# Patient Record
Sex: FEMALE | Race: OTHER | NOT HISPANIC OR LATINO | Employment: OTHER | URBAN - METROPOLITAN AREA
[De-identification: names, ages, dates, MRNs, and addresses within clinical notes are randomized per-mention and may not be internally consistent; named-entity substitution may affect disease eponyms.]

---

## 2023-02-11 ENCOUNTER — HOSPITAL ENCOUNTER (EMERGENCY)
Dept: DATA CONVERSION | Facility: HOSPITAL | Age: 83
Discharge: HOME | End: 2023-02-12
Attending: EMERGENCY MEDICINE

## 2023-02-11 DIAGNOSIS — Z79.899 OTHER LONG TERM (CURRENT) DRUG THERAPY: ICD-10-CM

## 2023-02-11 DIAGNOSIS — I10 ESSENTIAL (PRIMARY) HYPERTENSION: ICD-10-CM

## 2023-02-11 DIAGNOSIS — E78.5 HYPERLIPIDEMIA, UNSPECIFIED: ICD-10-CM

## 2023-02-11 DIAGNOSIS — R10.9 UNSPECIFIED ABDOMINAL PAIN: ICD-10-CM

## 2023-02-11 DIAGNOSIS — E03.9 HYPOTHYROIDISM, UNSPECIFIED: ICD-10-CM

## 2023-02-11 DIAGNOSIS — R42 DIZZINESS AND GIDDINESS: ICD-10-CM

## 2023-02-11 DIAGNOSIS — Z79.82 LONG TERM (CURRENT) USE OF ASPIRIN: ICD-10-CM

## 2023-02-11 DIAGNOSIS — M79.18 MYALGIA, OTHER SITE: ICD-10-CM
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
## 2023-02-11 DIAGNOSIS — Z79.84 LONG TERM (CURRENT) USE OF ORAL HYPOGLYCEMIC DRUGS: ICD-10-CM

## 2023-02-11 DIAGNOSIS — S00.03XA CONTUSION OF SCALP, INITIAL ENCOUNTER: ICD-10-CM

## 2023-02-11 DIAGNOSIS — E11.9 TYPE 2 DIABETES MELLITUS WITHOUT COMPLICATIONS (MULTI): ICD-10-CM

## 2023-02-11 DIAGNOSIS — N39.0 URINARY TRACT INFECTION, SITE NOT SPECIFIED: ICD-10-CM

## 2023-02-11 LAB
ALANINE AMINOTRANSFERASE (SGPT) (U/L) IN SER/PLAS: 13 U/L (ref 7–45)
ALBUMIN (G/DL) IN SER/PLAS: 4.2 G/DL (ref 3.4–5)
ALKALINE PHOSPHATASE (U/L) IN SER/PLAS: 94 U/L (ref 33–136)
ANION GAP IN SER/PLAS: 15 MMOL/L (ref 10–20)
APPEARANCE, URINE: CLEAR
ASPARTATE AMINOTRANSFERASE (SGOT) (U/L) IN SER/PLAS: 13 U/L (ref 9–39)
BASOPHILS (10*3/UL) IN BLOOD BY AUTOMATED COUNT: 0.02 X10E9/L (ref 0–0.1)
BASOPHILS/100 LEUKOCYTES IN BLOOD BY AUTOMATED COUNT: 0.2 % (ref 0–2)
BILIRUBIN TOTAL (MG/DL) IN SER/PLAS: 0.8 MG/DL (ref 0–1.2)
BILIRUBIN, URINE: NEGATIVE
BLOOD, URINE: ABNORMAL
CALCIUM (MG/DL) IN SER/PLAS: 8.6 MG/DL (ref 8.6–10.3)
CARBON DIOXIDE, TOTAL (MMOL/L) IN SER/PLAS: 27 MMOL/L (ref 21–32)
CHLORIDE (MMOL/L) IN SER/PLAS: 97 MMOL/L (ref 98–107)
COLOR, URINE: YELLOW
CREATININE (MG/DL) IN SER/PLAS: 0.59 MG/DL (ref 0.5–1.05)
EOSINOPHILS (10*3/UL) IN BLOOD BY AUTOMATED COUNT: 0 X10E9/L (ref 0–0.4)
EOSINOPHILS/100 LEUKOCYTES IN BLOOD BY AUTOMATED COUNT: 0 % (ref 0–6)
ERYTHROCYTE DISTRIBUTION WIDTH (RATIO) BY AUTOMATED COUNT: 12.5 % (ref 11.5–14.5)
ERYTHROCYTE MEAN CORPUSCULAR HEMOGLOBIN CONCENTRATION (G/DL) BY AUTOMATED: 33.4 G/DL (ref 32–36)
ERYTHROCYTE MEAN CORPUSCULAR VOLUME (FL) BY AUTOMATED COUNT: 89 FL (ref 80–100)
ERYTHROCYTES (10*6/UL) IN BLOOD BY AUTOMATED COUNT: 4.73 X10E12/L (ref 4–5.2)
GFR FEMALE: >90 ML/MIN/1.73M2
GLUCOSE (MG/DL) IN SER/PLAS: 201 MG/DL (ref 74–99)
GLUCOSE, URINE: ABNORMAL MG/DL
HEMATOCRIT (%) IN BLOOD BY AUTOMATED COUNT: 42.2 % (ref 36–46)
HEMOGLOBIN (G/DL) IN BLOOD: 14.1 G/DL (ref 12–16)
IMMATURE GRANULOCYTES/100 LEUKOCYTES IN BLOOD BY AUTOMATED COUNT: 0.4 % (ref 0–0.9)
INR IN PPP BY COAGULATION ASSAY: 1.2 (ref 0.9–1.1)
KETONES, URINE: ABNORMAL MG/DL
LEUKOCYTE ESTERASE, URINE: ABNORMAL
LEUKOCYTES (10*3/UL) IN BLOOD BY AUTOMATED COUNT: 8.4 X10E9/L (ref 4.4–11.3)
LYMPHOCYTES (10*3/UL) IN BLOOD BY AUTOMATED COUNT: 0.87 X10E9/L (ref 0.8–3)
LYMPHOCYTES/100 LEUKOCYTES IN BLOOD BY AUTOMATED COUNT: 10.4 % (ref 13–44)
MONOCYTES (10*3/UL) IN BLOOD BY AUTOMATED COUNT: 0.29 X10E9/L (ref 0.05–0.8)
MONOCYTES/100 LEUKOCYTES IN BLOOD BY AUTOMATED COUNT: 3.5 % (ref 2–10)
NEUTROPHILS (10*3/UL) IN BLOOD BY AUTOMATED COUNT: 7.19 X10E9/L (ref 1.6–5.5)
NEUTROPHILS/100 LEUKOCYTES IN BLOOD BY AUTOMATED COUNT: 85.5 % (ref 40–80)
NITRITE, URINE: NEGATIVE
NRBC (PER 100 WBCS) BY AUTOMATED COUNT: 0 /100 WBC (ref 0–0)
PH, URINE: 6 (ref 5–8)
PLATELETS (10*3/UL) IN BLOOD AUTOMATED COUNT: 200 X10E9/L (ref 150–450)
POTASSIUM (MMOL/L) IN SER/PLAS: 3.6 MMOL/L (ref 3.5–5.3)
PROTEIN TOTAL: 7 G/DL (ref 6.4–8.2)
PROTEIN, URINE: ABNORMAL MG/DL
PROTHROMBIN TIME (PT) IN PPP BY COAGULATION ASSAY: 13.4 SEC (ref 9.8–13.4)
RBC, URINE: ABNORMAL /HPF (ref 0–5)
SODIUM (MMOL/L) IN SER/PLAS: 135 MMOL/L (ref 136–145)
SPECIFIC GRAVITY, URINE: 1.01 (ref 1–1.03)
SQUAMOUS EPITHELIAL CELLS, URINE: <1 /HPF
TROPONIN I, HIGH SENSITIVITY: 7 NG/L (ref 0–13)
UREA NITROGEN (MG/DL) IN SER/PLAS: 10 MG/DL (ref 6–23)
UROBILINOGEN, URINE: <2 MG/DL (ref 0–1.9)
WBC, URINE: ABNORMAL /HPF (ref 0–5)

## 2023-02-12 LAB — POCT GLUCOSE: 207 MG/DL (ref 74–99)

## 2023-02-28 LAB
ATRIAL RATE: 96 BPM
P AXIS: 40 DEGREES
P OFFSET: 195 MS
P ONSET: 135 MS
PR INTERVAL: 156 MS
Q ONSET: 213 MS
QRS COUNT: 15 BEATS
QRS DURATION: 92 MS
QT INTERVAL: 384 MS
QTC CALCULATION(BAZETT): 485 MS
QTC FREDERICIA: 449 MS
R AXIS: -36 DEGREES
T AXIS: 19 DEGREES
T OFFSET: 405 MS
VENTRICULAR RATE: 96 BPM

## 2024-11-01 ENCOUNTER — OFFICE VISIT (OUTPATIENT)
Dept: PRIMARY CARE | Facility: CLINIC | Age: 84
End: 2024-11-01
Payer: MEDICAID

## 2024-11-01 VITALS
OXYGEN SATURATION: 96 % | HEIGHT: 65 IN | SYSTOLIC BLOOD PRESSURE: 134 MMHG | RESPIRATION RATE: 16 BRPM | WEIGHT: 178 LBS | DIASTOLIC BLOOD PRESSURE: 62 MMHG | BODY MASS INDEX: 29.66 KG/M2 | TEMPERATURE: 97.3 F | HEART RATE: 90 BPM

## 2024-11-01 DIAGNOSIS — G30.9 MODERATE ALZHEIMER'S DEMENTIA WITH MOOD DISTURBANCE, UNSPECIFIED TIMING OF DEMENTIA ONSET (MULTI): ICD-10-CM

## 2024-11-01 DIAGNOSIS — E11.9 TYPE 2 DIABETES MELLITUS WITHOUT COMPLICATION, WITHOUT LONG-TERM CURRENT USE OF INSULIN (MULTI): Primary | Chronic | ICD-10-CM

## 2024-11-01 DIAGNOSIS — Z00.00 HEALTH CARE MAINTENANCE: ICD-10-CM

## 2024-11-01 DIAGNOSIS — F02.B3 MODERATE ALZHEIMER'S DEMENTIA WITH MOOD DISTURBANCE, UNSPECIFIED TIMING OF DEMENTIA ONSET (MULTI): ICD-10-CM

## 2024-11-01 PROBLEM — E87.1 HYPONATREMIA: Status: ACTIVE | Noted: 2021-03-01

## 2024-11-01 PROBLEM — E06.3 HYPOTHYROIDISM DUE TO HASHIMOTO'S THYROIDITIS: Status: ACTIVE | Noted: 2021-02-28

## 2024-11-01 PROBLEM — E66.9 OBESITY (BMI 30-39.9): Status: ACTIVE | Noted: 2021-02-23

## 2024-11-01 PROBLEM — E83.42 HYPOMAGNESEMIA: Status: ACTIVE | Noted: 2021-03-01

## 2024-11-01 LAB
ALBUMIN SERPL BCP-MCNC: 4 G/DL (ref 3.4–5)
ALP SERPL-CCNC: 112 U/L (ref 33–136)
ALT SERPL W P-5'-P-CCNC: 12 U/L (ref 7–45)
ANION GAP SERPL CALC-SCNC: 12 MMOL/L (ref 10–20)
AST SERPL W P-5'-P-CCNC: 11 U/L (ref 9–39)
BASOPHILS # BLD AUTO: 0.02 X10*3/UL (ref 0–0.1)
BASOPHILS NFR BLD AUTO: 0.5 %
BILIRUB SERPL-MCNC: 0.7 MG/DL (ref 0–1.2)
BUN SERPL-MCNC: 10 MG/DL (ref 6–23)
CALCIUM SERPL-MCNC: 8.8 MG/DL (ref 8.6–10.3)
CHLORIDE SERPL-SCNC: 100 MMOL/L (ref 98–107)
CO2 SERPL-SCNC: 26 MMOL/L (ref 21–32)
CREAT SERPL-MCNC: 0.63 MG/DL (ref 0.5–1.05)
EGFRCR SERPLBLD CKD-EPI 2021: 89 ML/MIN/1.73M*2
EOSINOPHIL # BLD AUTO: 0.04 X10*3/UL (ref 0–0.4)
EOSINOPHIL NFR BLD AUTO: 1 %
ERYTHROCYTE [DISTWIDTH] IN BLOOD BY AUTOMATED COUNT: 17.1 % (ref 11.5–14.5)
GLUCOSE SERPL-MCNC: 393 MG/DL (ref 74–99)
HCT VFR BLD AUTO: 36 % (ref 36–46)
HGB BLD-MCNC: 10.7 G/DL (ref 12–16)
IMM GRANULOCYTES # BLD AUTO: 0.01 X10*3/UL (ref 0–0.5)
IMM GRANULOCYTES NFR BLD AUTO: 0.2 % (ref 0–0.9)
LYMPHOCYTES # BLD AUTO: 1.31 X10*3/UL (ref 0.8–3)
LYMPHOCYTES NFR BLD AUTO: 31.8 %
MCH RBC QN AUTO: 21.7 PG (ref 26–34)
MCHC RBC AUTO-ENTMCNC: 29.7 G/DL (ref 32–36)
MCV RBC AUTO: 73 FL (ref 80–100)
MONOCYTES # BLD AUTO: 0.32 X10*3/UL (ref 0.05–0.8)
MONOCYTES NFR BLD AUTO: 7.8 %
NEUTROPHILS # BLD AUTO: 2.42 X10*3/UL (ref 1.6–5.5)
NEUTROPHILS NFR BLD AUTO: 58.7 %
NRBC BLD-RTO: 0 /100 WBCS (ref 0–0)
PLATELET # BLD AUTO: 292 X10*3/UL (ref 150–450)
POC HEMOGLOBIN A1C: 9.6 % (ref 4.2–6.5)
POTASSIUM SERPL-SCNC: 4.2 MMOL/L (ref 3.5–5.3)
PROT SERPL-MCNC: 7 G/DL (ref 6.4–8.2)
RBC # BLD AUTO: 4.93 X10*6/UL (ref 4–5.2)
SODIUM SERPL-SCNC: 134 MMOL/L (ref 136–145)
TSH SERPL-ACNC: 3.27 MIU/L (ref 0.44–3.98)
WBC # BLD AUTO: 4.1 X10*3/UL (ref 4.4–11.3)

## 2024-11-01 PROCEDURE — 80053 COMPREHEN METABOLIC PANEL: CPT

## 2024-11-01 PROCEDURE — 84443 ASSAY THYROID STIM HORMONE: CPT

## 2024-11-01 PROCEDURE — 85025 COMPLETE CBC W/AUTO DIFF WBC: CPT

## 2024-11-01 PROCEDURE — 1159F MED LIST DOCD IN RCRD: CPT

## 2024-11-01 PROCEDURE — 83036 HEMOGLOBIN GLYCOSYLATED A1C: CPT

## 2024-11-01 PROCEDURE — 99204 OFFICE O/P NEW MOD 45 MIN: CPT

## 2024-11-01 RX ORDER — ATORVASTATIN CALCIUM 10 MG/1
1 TABLET, FILM COATED ORAL DAILY
COMMUNITY

## 2024-11-01 RX ORDER — METOPROLOL TARTRATE 25 MG/1
25 TABLET, FILM COATED ORAL
COMMUNITY

## 2024-11-01 RX ORDER — METFORMIN HYDROCHLORIDE 500 MG/1
500 TABLET ORAL 2 TIMES DAILY
COMMUNITY

## 2024-11-01 RX ORDER — ASPIRIN 81 MG/1
81 TABLET ORAL
COMMUNITY
End: 2024-11-01 | Stop reason: WASHOUT

## 2024-11-01 RX ORDER — LEVOTHYROXINE SODIUM 25 UG/1
1 TABLET ORAL DAILY
COMMUNITY

## 2024-11-01 NOTE — PROGRESS NOTES
"Subjective   Patient ID: Daron Burrell is a 84 y.o. female who presents for Diabetes (New patient DM/Recently moved for [place]), Hypertension (HTN concerns), and Dementia (Dementia is worsening. /With SON Jose ).    Patient here today to establish care.  Is here with son who is part-time caregiver.  Patient otherwise living at home independently.  Was previously receiving care in [place] however had moved here to be closer to other family.  Additionally concerns for patient's underlying diabetes and dementia.  Does have established history of dementia which patient is on states has been gradually worsening.  No recent acute drop-offs of cognition that is endorsed or other neurological losses of function.  Patient does live alone, son states that he has placed cameras in her home and lives approximately 5 minutes away.  Is busy with all personal and professional life however does have granddaughters that are able and willing to assist grandmother.  Has cell phone and life alert as well.  Son also states that he is attempting to set up partial home health aide services and has gone through great lengths and establishing connections with services.  Is concerned with mother's mood and behavior as well stating that she appears more withdrawn and unmotivated which she feels to be related to dementia symptoms.  Patient is compliant with all currently prescribed medications that are reported.  Does use pill organizer.  Diet is stated to be poor, patient son states that he feels as though his mother forgets to eat or has little or no interest in eating secondary to loss of her sense of smell/taste (due to Afrin use).  No reported regular glucose monitoring.         Review of Systems    Objective   /62 (BP Location: Right arm, Patient Position: Sitting)   Pulse 90   Temp 36.3 °C (97.3 °F)   Resp 16   Ht 1.651 m (5' 5\")   Wt 80.7 kg (178 lb)   SpO2 96%   BMI 29.62 kg/m²     Physical Exam  Constitutional:      "  General: She is not in acute distress.     Appearance: Normal appearance. She is not ill-appearing.      Comments: Participatory with exam, calm and pleasant.  Interview and physical was carried out with aid of son's translation efforts.   Cardiovascular:      Rate and Rhythm: Normal rate and regular rhythm.      Heart sounds: No murmur heard.     No friction rub. No gallop.   Pulmonary:      Effort: Pulmonary effort is normal. No respiratory distress.      Breath sounds: Normal breath sounds. No wheezing, rhonchi or rales.   Abdominal:      General: Abdomen is flat.      Palpations: Abdomen is soft.      Tenderness: There is no abdominal tenderness.   Musculoskeletal:      Right lower leg: No edema.      Left lower leg: No edema.   Neurological:      General: No focal deficit present.      Mental Status: She is alert. Mental status is at baseline.      Comments: Fine pill-rolling tremor present within right hand.   Psychiatric:         Mood and Affect: Mood normal.         Behavior: Behavior normal.         Assessment/Plan   Problem List Items Addressed This Visit             ICD-10-CM    Type 2 diabetes mellitus without complication, without long-term current use of insulin (Multi) - Primary (Chronic) E11.9    Relevant Orders    POCT glycosylated hemoglobin (Hb A1C) manually resulted (Completed)    Moderate Alzheimer's dementia with mood disturbance (Multi) G30.9, F02.B3     Dementia at this time moderate, given that English is second language is difficult to establish patient's mental baseline from conversation alone.  Alzheimer's most likely etiology however cannot exclude Parkinson's disease given tremor notable, vascular dementia given vascular risk factors such as uncontrolled diabetes.  Sounds to be gradual decline per patient's son therefore less suspect a vascular dementia.  Discussed acetylcholine agents such as donepezil to curb memory decline, send patient states that she would like to research these  further.  Will await OSH records to see medical history of cognitive decline.          Other Visit Diagnoses         Codes    Health care maintenance     Z00.00    Relevant Orders    TSH with reflex to Free T4 if abnormal (Completed)    Comprehensive Metabolic Panel (Completed)    CBC and Auto Differential (Completed)        Will obtain basic blood work at this time.  Patient's A1c significantly elevated however will await remainder of blood work prior to augmenting diabetic management.  Will consider SGLT2 along with increase in metformin.  Will attempt to avoid insulin despite high A1c given patient's age and mental status therefore difficulty in daily dosing.  Will explore further with son of patient regarding safety in home.  I discussed potential risk of home fire given patient memory lapses.  Patient son does feel that given cameras in home and family member proximity and patient's overall mental status that she would be able to respond or be helped in hypothetical emergent situations.  Will complete a release of records to obtain OSH charts.  Will plan close follow-up for further diabetic management

## 2024-11-04 DIAGNOSIS — E83.42 HYPOMAGNESEMIA: Primary | ICD-10-CM

## 2024-11-04 DIAGNOSIS — E53.8 B12 DEFICIENCY: ICD-10-CM

## 2024-11-04 DIAGNOSIS — D50.9 IRON DEFICIENCY ANEMIA, UNSPECIFIED IRON DEFICIENCY ANEMIA TYPE: ICD-10-CM

## 2024-11-04 PROBLEM — G30.9 MODERATE ALZHEIMER'S DEMENTIA WITH MOOD DISTURBANCE (MULTI): Status: ACTIVE | Noted: 2024-11-04

## 2024-11-04 PROBLEM — F02.B3 MODERATE ALZHEIMER'S DEMENTIA WITH MOOD DISTURBANCE (MULTI): Status: ACTIVE | Noted: 2024-11-04

## 2024-11-04 NOTE — ASSESSMENT & PLAN NOTE
History of iron deficiency anemia per chart review.  Will repeat iron panels at this time  Expect secondary to poor diet

## 2024-11-04 NOTE — ASSESSMENT & PLAN NOTE
Dementia at this time moderate, given that English is second language is difficult to establish patient's mental baseline from conversation alone.  Alzheimer's most likely etiology however cannot exclude Parkinson's disease given tremor notable, vascular dementia given vascular risk factors such as uncontrolled diabetes.  Sounds to be gradual decline per patient's son therefore less suspect a vascular dementia.  Discussed acetylcholine agents such as donepezil to curb memory decline, send patient states that she would like to research these further.  Will await OSH records to see medical history of cognitive decline.

## 2024-11-05 ENCOUNTER — LAB (OUTPATIENT)
Dept: LAB | Facility: LAB | Age: 84
End: 2024-11-05
Payer: MEDICAID

## 2024-11-05 DIAGNOSIS — D50.9 IRON DEFICIENCY ANEMIA, UNSPECIFIED IRON DEFICIENCY ANEMIA TYPE: ICD-10-CM

## 2024-11-05 DIAGNOSIS — E53.8 B12 DEFICIENCY: ICD-10-CM

## 2024-11-05 DIAGNOSIS — E83.42 HYPOMAGNESEMIA: ICD-10-CM

## 2024-11-05 LAB
FERRITIN SERPL-MCNC: 8 NG/ML (ref 8–150)
IRON SATN MFR SERPL: 4 % (ref 25–45)
IRON SERPL-MCNC: 13 UG/DL (ref 35–150)
MAGNESIUM SERPL-MCNC: 1.99 MG/DL (ref 1.6–2.4)
TIBC SERPL-MCNC: 337 UG/DL (ref 240–445)
UIBC SERPL-MCNC: 324 UG/DL (ref 110–370)
VIT B12 SERPL-MCNC: 275 PG/ML (ref 211–911)

## 2024-11-05 PROCEDURE — 82728 ASSAY OF FERRITIN: CPT

## 2024-11-05 PROCEDURE — 36415 COLL VENOUS BLD VENIPUNCTURE: CPT

## 2024-11-05 PROCEDURE — 83550 IRON BINDING TEST: CPT

## 2024-11-05 PROCEDURE — 83540 ASSAY OF IRON: CPT

## 2024-11-05 PROCEDURE — 82607 VITAMIN B-12: CPT

## 2024-11-05 PROCEDURE — 83735 ASSAY OF MAGNESIUM: CPT

## 2024-11-06 RX ORDER — FERROUS SULFATE 325(65) MG
1 TABLET ORAL EVERY OTHER DAY
Qty: 45 TABLET | Refills: 0 | Status: SHIPPED | OUTPATIENT
Start: 2024-11-06 | End: 2025-02-04

## 2024-11-12 ENCOUNTER — APPOINTMENT (OUTPATIENT)
Dept: PRIMARY CARE | Facility: CLINIC | Age: 84
End: 2024-11-12
Payer: MEDICAID

## 2024-11-12 VITALS
DIASTOLIC BLOOD PRESSURE: 84 MMHG | TEMPERATURE: 97.2 F | RESPIRATION RATE: 18 BRPM | OXYGEN SATURATION: 97 % | HEART RATE: 96 BPM | SYSTOLIC BLOOD PRESSURE: 138 MMHG | BODY MASS INDEX: 29.62 KG/M2 | WEIGHT: 178 LBS

## 2024-11-12 DIAGNOSIS — D50.9 IRON DEFICIENCY ANEMIA, UNSPECIFIED IRON DEFICIENCY ANEMIA TYPE: ICD-10-CM

## 2024-11-12 DIAGNOSIS — E11.9 TYPE 2 DIABETES MELLITUS WITHOUT COMPLICATION, WITHOUT LONG-TERM CURRENT USE OF INSULIN (MULTI): Primary | Chronic | ICD-10-CM

## 2024-11-12 DIAGNOSIS — E06.3 HYPOTHYROIDISM DUE TO HASHIMOTO'S THYROIDITIS: ICD-10-CM

## 2024-11-12 DIAGNOSIS — I10 PRIMARY HYPERTENSION: ICD-10-CM

## 2024-11-12 PROCEDURE — 99214 OFFICE O/P EST MOD 30 MIN: CPT

## 2024-11-12 PROCEDURE — 1036F TOBACCO NON-USER: CPT

## 2024-11-12 PROCEDURE — 1159F MED LIST DOCD IN RCRD: CPT

## 2024-11-12 PROCEDURE — 3079F DIAST BP 80-89 MM HG: CPT

## 2024-11-12 PROCEDURE — 3075F SYST BP GE 130 - 139MM HG: CPT

## 2024-11-12 RX ORDER — METFORMIN HYDROCHLORIDE 500 MG/1
500 TABLET ORAL 3 TIMES DAILY
Qty: 270 TABLET | Refills: 1 | Status: SHIPPED | OUTPATIENT
Start: 2024-11-12 | End: 2025-05-11

## 2024-11-12 RX ORDER — LEVOTHYROXINE SODIUM 25 UG/1
25 TABLET ORAL DAILY
Qty: 90 TABLET | Refills: 1 | Status: SHIPPED | OUTPATIENT
Start: 2024-11-12 | End: 2025-05-11

## 2024-11-12 RX ORDER — DAPAGLIFLOZIN 5 MG/1
5 TABLET, FILM COATED ORAL DAILY
Qty: 90 TABLET | Refills: 0 | Status: SHIPPED | OUTPATIENT
Start: 2024-11-12 | End: 2025-02-10

## 2024-11-12 RX ORDER — METOPROLOL TARTRATE 25 MG/1
25 TABLET, FILM COATED ORAL
Qty: 60 TABLET | Refills: 0 | Status: SHIPPED | OUTPATIENT
Start: 2024-11-12 | End: 2025-01-11

## 2024-11-12 RX ORDER — ATORVASTATIN CALCIUM 10 MG/1
10 TABLET, FILM COATED ORAL DAILY
Qty: 90 TABLET | Refills: 1 | Status: SHIPPED | OUTPATIENT
Start: 2024-11-12 | End: 2025-05-11

## 2024-11-12 NOTE — PROGRESS NOTES
Subjective   Patient ID: Daron Burrell is a 84 y.o. female who presents for Follow-up (Review labs/Here with son, Jose ).    Recently established patient here today to discuss recent lab results notable for severe hyperglycemia along with iron deficiency anemia.  At previous appointment set of patient had expressed concern regarding patient's mental status.  Has history of dementia.  Were considering treatment for memory loss along with associated mood disturbances (depression, withdrawn).  A1c taken at last visit shown to be significantly elevated 9.6%.  Currently on metformin 500 mg twice daily.  Does have some symptoms of hyperglycemia namely polydipsia/polyuria.  No significant fatigue or confusion.  No GI upset/nausea vomiting concerning for ketosis, no recent precipitous weight loss.  Regarding patient's iron deficiency anemia: Patient is herself poor historian and given that she lives by herself with moderate memory difficulties accuracy of interviewing is limited, however son highly involved with her care and is present at today's visit able to additionally provide testimonial for patient..  Despite this patient denies any melenic stools/hematochezia, abnormal uterine bleeding, abdominal complaints-abdominal fullness, abdominal pain with meals, early satiety.           Review of Systems    Objective   /84 (BP Location: Left arm, Patient Position: Sitting)   Pulse 96   Temp 36.2 °C (97.2 °F)   Resp 18   Wt 80.7 kg (178 lb)   SpO2 97%   BMI 29.62 kg/m²     Physical Exam  Constitutional:       General: She is not in acute distress.     Appearance: Normal appearance. She is not ill-appearing.   Cardiovascular:      Rate and Rhythm: Normal rate and regular rhythm.      Heart sounds: No murmur heard.     No friction rub. No gallop.   Pulmonary:      Effort: Pulmonary effort is normal. No respiratory distress.      Breath sounds: Normal breath sounds. No wheezing, rhonchi or rales.   Abdominal:       General: Abdomen is flat. There is no distension.      Palpations: Abdomen is soft. There is no mass.      Tenderness: There is no abdominal tenderness. There is no guarding.   Neurological:      General: No focal deficit present.      Mental Status: She is alert and oriented to person, place, and time.   Psychiatric:         Mood and Affect: Mood normal.         Behavior: Behavior normal.         Assessment/Plan   Problem List Items Addressed This Visit             ICD-10-CM    Type 2 diabetes mellitus without complication, without long-term current use of insulin (Multi) - Primary (Chronic) E11.9     Increase metformin today to 500 mg 3 times daily  Add SGLT2, 5 mg with eventual up titration to 10.  Denies history of yeast infection or frequent UTIs.  Discussed implementing insulin given high A1c, through shared decision making we will elect for p.o. treatment only.  Monitor fasting blood sugars  Discussed importance of diabetic diet at last visit, limit simple sugars.         Relevant Medications    dapagliflozin propanediol (Farxiga) 5 mg    metFORMIN (Glucophage) 500 mg tablet    atorvastatin (Lipitor) 10 mg tablet    Hypothyroidism due to Hashimoto's thyroiditis E06.3     Stable at this time, most recent TSH shows patient within normal limits.  Continue with Synthroid daily.         Relevant Medications    levothyroxine (Synthroid, Levoxyl) 25 mcg tablet    Iron deficiency anemia D50.9     No concerning features associated with anemia.  Denies any hematochezia or melenic stool or other discernible source of bleeding.  No recent changes in patient's weight.  Will wait results from outside hospital system regarding patient's colonoscopy histories or other abdominal imaging in the setting of malignant process driving anemia.  Patient has been eating less therefore may represent overall nutritional deficiency due to poor diet.  Will repeat blood work in approximately 6 weeks to assess liver stores along with blood  count.         Relevant Orders    CBC    Iron and TIBC    Ferritin    Primary hypertension I10     Await results from outside hospital as to why patient taking metoprolol versus other antihypertensive medications.  If no specific need we will likely transition patient to other antihypertensive medication.  Otherwise blood pressure at goal today.  Continue metoprolol in the interim.         Relevant Medications    metoprolol tartrate (Lopressor) 25 mg tablet   Return to office in 6 weeks  Fabian Narvaez,

## 2024-11-12 NOTE — ASSESSMENT & PLAN NOTE
Await results from outside hospital as to why patient taking metoprolol versus other antihypertensive medications.  If no specific need we will likely transition patient to other antihypertensive medication.  Otherwise blood pressure at goal today.  Continue metoprolol in the interim.  
Increase metformin today to 500 mg 3 times daily  Add SGLT2, 5 mg with eventual up titration to 10.  Denies history of yeast infection or frequent UTIs.  Discussed implementing insulin given high A1c, through shared decision making we will elect for p.o. treatment only.  Monitor fasting blood sugars  Discussed importance of diabetic diet at last visit, limit simple sugars.  
No concerning features associated with anemia.  Denies any hematochezia or melenic stool or other discernible source of bleeding.  No recent changes in patient's weight.  Will wait results from outside hospital system regarding patient's colonoscopy histories or other abdominal imaging in the setting of malignant process driving anemia.  Patient has been eating less therefore may represent overall nutritional deficiency due to poor diet.  Will repeat blood work in approximately 6 weeks to assess liver stores along with blood count.  
Stable at this time, most recent TSH shows patient within normal limits.  Continue with Synthroid daily.  
Apixaban/Eliquis - Compliance/Apixaban/Eliquis - Dietary Advice/Apixaban/Eliquis - Follow up monitoring/Apixaban/Eliquis - Potential for adverse drug reactions and interactions

## 2025-01-07 ENCOUNTER — APPOINTMENT (OUTPATIENT)
Dept: PRIMARY CARE | Facility: CLINIC | Age: 85
End: 2025-01-07
Payer: MEDICAID

## 2025-01-14 ENCOUNTER — APPOINTMENT (OUTPATIENT)
Dept: PRIMARY CARE | Facility: CLINIC | Age: 85
End: 2025-01-14
Payer: MEDICAID

## 2025-01-14 VITALS
WEIGHT: 165 LBS | BODY MASS INDEX: 27.49 KG/M2 | TEMPERATURE: 98.1 F | OXYGEN SATURATION: 97 % | DIASTOLIC BLOOD PRESSURE: 60 MMHG | HEART RATE: 66 BPM | SYSTOLIC BLOOD PRESSURE: 134 MMHG | RESPIRATION RATE: 14 BRPM | HEIGHT: 65 IN

## 2025-01-14 DIAGNOSIS — R63.0 DECREASE IN APPETITE: ICD-10-CM

## 2025-01-14 DIAGNOSIS — E11.9 TYPE 2 DIABETES MELLITUS WITHOUT COMPLICATION, WITHOUT LONG-TERM CURRENT USE OF INSULIN (MULTI): Primary | Chronic | ICD-10-CM

## 2025-01-14 LAB — POC HEMOGLOBIN A1C: 8.8 % (ref 4.2–6.5)

## 2025-01-14 PROCEDURE — 1159F MED LIST DOCD IN RCRD: CPT

## 2025-01-14 PROCEDURE — 3075F SYST BP GE 130 - 139MM HG: CPT

## 2025-01-14 PROCEDURE — 83036 HEMOGLOBIN GLYCOSYLATED A1C: CPT

## 2025-01-14 PROCEDURE — 1036F TOBACCO NON-USER: CPT

## 2025-01-14 PROCEDURE — 1124F ACP DISCUSS-NO DSCNMKR DOCD: CPT

## 2025-01-14 PROCEDURE — 3078F DIAST BP <80 MM HG: CPT

## 2025-01-14 PROCEDURE — 99214 OFFICE O/P EST MOD 30 MIN: CPT

## 2025-01-14 NOTE — ASSESSMENT & PLAN NOTE
Will recommend patient supplement diet with nutritional shakes.  Is down 13 pounds since initial evaluation 2 months prior  Does have underlying anemia and from review of outside hospital records no previous colonoscopy documented therefore cannot exclude GI malignancy however decreased oral intake likely to be underlying etiology.  Return to office for weight recheck 1 month

## 2025-01-14 NOTE — PROGRESS NOTES
"Subjective   Patient ID: Daron Burrell is a 84 y.o. female who presents for Follow-up (2 month follow up. /DM follow up/Her with granddaughter Samira).    Patient here today to follow-up regarding diabetes.  Acute concerns at this time, patient states that she has had decreased appetite.  Denies any visual changes, no nausea vomiting or diarrhea.  Denies any constipation or other bowel changes.  No blood in stool as well.  Patient is compliant with all her medications.  Since most recent dose adjustment has tolerated well.  Down 13 pounds since last evaluation, patient states that she has had poor appetite as of recent.         Review of Systems    Objective   /60 (BP Location: Left arm, Patient Position: Sitting)   Pulse 66   Temp 36.7 °C (98.1 °F)   Resp 14   Ht 1.651 m (5' 5\")   Wt 74.8 kg (165 lb)   SpO2 97%   BMI 27.46 kg/m²     Physical Exam  Constitutional:       General: She is not in acute distress.     Appearance: Normal appearance. She is not ill-appearing.   Eyes:      Conjunctiva/sclera: Conjunctivae normal.   Cardiovascular:      Rate and Rhythm: Normal rate and regular rhythm.      Heart sounds: No murmur heard.     No friction rub. No gallop.   Pulmonary:      Effort: Pulmonary effort is normal. No respiratory distress.      Breath sounds: Normal breath sounds. No wheezing, rhonchi or rales.   Abdominal:      General: There is no distension.      Palpations: Abdomen is soft. There is no mass.      Tenderness: There is no abdominal tenderness. There is no guarding.   Neurological:      General: No focal deficit present.      Mental Status: She is alert and oriented to person, place, and time.   Psychiatric:         Mood and Affect: Mood normal.         Behavior: Behavior normal.         Assessment/Plan   Problem List Items Addressed This Visit             ICD-10-CM    Type 2 diabetes mellitus without complication, without long-term current use of insulin (Multi) - Primary (Chronic) E11.9 "    Relevant Orders    POCT glycosylated hemoglobin (Hb A1C) manually resulted (Completed)    Albumin-Creatinine Ratio, Urine Random    Decrease in appetite R63.0     Will recommend patient supplement diet with nutritional shakes.  Is down 13 pounds since initial evaluation 2 months prior  Does have underlying anemia and from review of outside hospital records no previous colonoscopy documented therefore cannot exclude GI malignancy however decreased oral intake likely to be underlying etiology.  Return to office for weight recheck 1 month        A1c erroneously ordered today, patient not due for 1 month however did show interval improvement in glycemia.  Continue with metformin/Farxiga.  Has tolerated increase in metformin well  Complete albumin to creatinine ratio at next visit.    Fabian Narvaez, DO

## 2025-02-04 LAB
ERYTHROCYTE [DISTWIDTH] IN BLOOD BY AUTOMATED COUNT: 16.8 % (ref 11–15)
FERRITIN SERPL-MCNC: 12 NG/ML (ref 16–288)
HCT VFR BLD AUTO: 47.3 % (ref 35–45)
HGB BLD-MCNC: 14.9 G/DL (ref 11.7–15.5)
IRON SATN MFR SERPL: 14 % (CALC) (ref 16–45)
IRON SERPL-MCNC: 40 MCG/DL (ref 45–160)
MCH RBC QN AUTO: 26.4 PG (ref 27–33)
MCHC RBC AUTO-ENTMCNC: 31.5 G/DL (ref 32–36)
MCV RBC AUTO: 83.9 FL (ref 80–100)
PLATELET # BLD AUTO: 247 THOUSAND/UL (ref 140–400)
PMV BLD REES-ECKER: 9 FL (ref 7.5–12.5)
RBC # BLD AUTO: 5.64 MILLION/UL (ref 3.8–5.1)
TIBC SERPL-MCNC: 287 MCG/DL (CALC) (ref 250–450)
WBC # BLD AUTO: 6 THOUSAND/UL (ref 3.8–10.8)

## 2025-02-16 DIAGNOSIS — E11.9 TYPE 2 DIABETES MELLITUS WITHOUT COMPLICATION, WITHOUT LONG-TERM CURRENT USE OF INSULIN (MULTI): Chronic | ICD-10-CM

## 2025-02-17 RX ORDER — DAPAGLIFLOZIN 5 MG/1
5 TABLET, FILM COATED ORAL DAILY
Qty: 90 TABLET | Refills: 0 | Status: SHIPPED | OUTPATIENT
Start: 2025-02-17 | End: 2025-02-19 | Stop reason: SDUPTHER

## 2025-02-19 ENCOUNTER — APPOINTMENT (OUTPATIENT)
Dept: PRIMARY CARE | Facility: CLINIC | Age: 85
End: 2025-02-19
Payer: MEDICARE

## 2025-02-19 VITALS
DIASTOLIC BLOOD PRESSURE: 62 MMHG | SYSTOLIC BLOOD PRESSURE: 128 MMHG | WEIGHT: 164 LBS | BODY MASS INDEX: 27.32 KG/M2 | HEIGHT: 65 IN | HEART RATE: 76 BPM | TEMPERATURE: 97.1 F | OXYGEN SATURATION: 95 % | RESPIRATION RATE: 18 BRPM

## 2025-02-19 DIAGNOSIS — E06.3 HYPOTHYROIDISM DUE TO HASHIMOTO'S THYROIDITIS: ICD-10-CM

## 2025-02-19 DIAGNOSIS — E11.9 TYPE 2 DIABETES MELLITUS WITHOUT COMPLICATION, WITHOUT LONG-TERM CURRENT USE OF INSULIN (MULTI): Chronic | ICD-10-CM

## 2025-02-19 PROCEDURE — 1159F MED LIST DOCD IN RCRD: CPT

## 2025-02-19 PROCEDURE — 99214 OFFICE O/P EST MOD 30 MIN: CPT

## 2025-02-19 PROCEDURE — 3074F SYST BP LT 130 MM HG: CPT

## 2025-02-19 PROCEDURE — 1036F TOBACCO NON-USER: CPT

## 2025-02-19 PROCEDURE — 3078F DIAST BP <80 MM HG: CPT

## 2025-02-19 PROCEDURE — G2211 COMPLEX E/M VISIT ADD ON: HCPCS

## 2025-02-19 RX ORDER — LEVOTHYROXINE SODIUM 25 UG/1
25 TABLET ORAL DAILY
Qty: 90 TABLET | Refills: 1 | Status: SHIPPED | OUTPATIENT
Start: 2025-02-19 | End: 2025-08-18

## 2025-02-19 RX ORDER — FERROUS SULFATE 325(65) MG
325 TABLET ORAL
COMMUNITY

## 2025-02-19 RX ORDER — DAPAGLIFLOZIN 10 MG/1
10 TABLET, FILM COATED ORAL DAILY
Qty: 90 TABLET | Refills: 1 | Status: SHIPPED | OUTPATIENT
Start: 2025-02-19 | End: 2025-08-18

## 2025-02-19 RX ORDER — METFORMIN HYDROCHLORIDE 500 MG/1
500 TABLET ORAL 3 TIMES DAILY
Qty: 270 TABLET | Refills: 1 | Status: SHIPPED | OUTPATIENT
Start: 2025-02-19 | End: 2025-08-18

## 2025-02-19 RX ORDER — ATORVASTATIN CALCIUM 10 MG/1
10 TABLET, FILM COATED ORAL DAILY
Qty: 90 TABLET | Refills: 1 | Status: SHIPPED | OUTPATIENT
Start: 2025-02-19 | End: 2025-08-18

## 2025-02-19 ASSESSMENT — PATIENT HEALTH QUESTIONNAIRE - PHQ9
1. LITTLE INTEREST OR PLEASURE IN DOING THINGS: NOT AT ALL
2. FEELING DOWN, DEPRESSED OR HOPELESS: NOT AT ALL
SUM OF ALL RESPONSES TO PHQ9 QUESTIONS 1 AND 2: 0

## 2025-02-19 NOTE — PROGRESS NOTES
"Subjective   Patient ID: Daron Burrell is a 84 y.o. female who presents for Follow-up (Review labs. /Greand daughter Mishel wants to talk about meds for Dementia.).    Patient here today with granddaughter for follow-up visit.  Medication review, requesting medication refills for diabetes along with hypothyroid and lipid-lowering medications.  Patient states feeling her normal state of health.  No acute worsening memory concerns voiced by patient's granddaughter at this time.  Does not endorse any significant constipation, review of most recent iron panel shows mild increase in serum ferritin.  Patient is compliant with supplementation.         Review of Systems    Objective   /62 (BP Location: Left arm, Patient Position: Sitting)   Pulse 76   Temp 36.2 °C (97.1 °F)   Resp 18   Ht 1.651 m (5' 5\")   Wt 74.4 kg (164 lb)   SpO2 95%   BMI 27.29 kg/m²     Physical Exam  Constitutional:       General: She is not in acute distress.     Appearance: Normal appearance. She is not ill-appearing.   Eyes:      General: No scleral icterus.  Cardiovascular:      Rate and Rhythm: Normal rate and regular rhythm.      Heart sounds: No murmur heard.     No friction rub. No gallop.   Pulmonary:      Effort: Pulmonary effort is normal. No respiratory distress.      Breath sounds: Normal breath sounds. No wheezing, rhonchi or rales.   Neurological:      General: No focal deficit present.      Mental Status: She is alert and oriented to person, place, and time.   Psychiatric:         Mood and Affect: Mood normal.         Behavior: Behavior normal.         Assessment/Plan   Problem List Items Addressed This Visit             ICD-10-CM    Type 2 diabetes mellitus without complication, without long-term current use of insulin (Multi) (Chronic) E11.9    Relevant Medications    metFORMIN (Glucophage) 500 mg tablet    dapagliflozin propanediol (Farxiga) 10 mg    atorvastatin (Lipitor) 10 mg tablet    Hypothyroidism due to " Hashimoto's thyroiditis E06.3    Relevant Medications    levothyroxine (Synthroid, Levoxyl) 25 mcg tablet   Refills provided for medications at this time, patient tolerating well.  Will increase Farxiga to 10 mg as patient is tolerated well.  Due for A1c in 2 months, no recent urinary tract infection despite SGLT2  Will recheck iron panel as well to ensure adequate supplementation.  If failure of replenishment of iron stores despite supplementation will consider advanced workup including evaluation for occult bleeding.  Most recent blood panel showed improvement of hemoglobin however ferritin remains low.    Follow-up 2 months, repeat A1c at that time, repeat iron panel.  Complete GFR/urine albumin creatinine ratios    Fabian Narvaez DO

## 2025-03-13 ENCOUNTER — APPOINTMENT (OUTPATIENT)
Dept: RADIOLOGY | Facility: HOSPITAL | Age: 85
End: 2025-03-13
Payer: COMMERCIAL

## 2025-03-13 ENCOUNTER — HOSPITAL ENCOUNTER (EMERGENCY)
Facility: HOSPITAL | Age: 85
Discharge: HOME | End: 2025-03-13
Payer: COMMERCIAL

## 2025-03-13 VITALS
RESPIRATION RATE: 18 BRPM | HEIGHT: 65 IN | HEART RATE: 96 BPM | DIASTOLIC BLOOD PRESSURE: 82 MMHG | SYSTOLIC BLOOD PRESSURE: 132 MMHG | OXYGEN SATURATION: 97 % | BODY MASS INDEX: 37.15 KG/M2 | TEMPERATURE: 98.2 F | WEIGHT: 223 LBS

## 2025-03-13 DIAGNOSIS — N39.0 UTI (URINARY TRACT INFECTION), UNCOMPLICATED: Primary | ICD-10-CM

## 2025-03-13 LAB
ALBUMIN SERPL BCP-MCNC: 4.2 G/DL (ref 3.4–5)
ALP SERPL-CCNC: 84 U/L (ref 33–136)
ALT SERPL W P-5'-P-CCNC: 14 U/L (ref 7–45)
ANION GAP SERPL CALC-SCNC: 17 MMOL/L (ref 10–20)
APPEARANCE UR: CLEAR
AST SERPL W P-5'-P-CCNC: 23 U/L (ref 9–39)
BACTERIA #/AREA URNS AUTO: ABNORMAL /HPF
BASOPHILS # BLD AUTO: 0.02 X10*3/UL (ref 0–0.1)
BASOPHILS NFR BLD AUTO: 0.3 %
BILIRUB SERPL-MCNC: 0.7 MG/DL (ref 0–1.2)
BILIRUB UR STRIP.AUTO-MCNC: NEGATIVE MG/DL
BUN SERPL-MCNC: 12 MG/DL (ref 6–23)
CALCIUM SERPL-MCNC: 9.1 MG/DL (ref 8.6–10.3)
CARDIAC TROPONIN I PNL SERPL HS: 3 NG/L (ref 0–13)
CHLORIDE SERPL-SCNC: 102 MMOL/L (ref 98–107)
CO2 SERPL-SCNC: 21 MMOL/L (ref 21–32)
COLOR UR: YELLOW
CREAT SERPL-MCNC: 0.49 MG/DL (ref 0.5–1.05)
EGFRCR SERPLBLD CKD-EPI 2021: >90 ML/MIN/1.73M*2
EOSINOPHIL # BLD AUTO: 0.02 X10*3/UL (ref 0–0.4)
EOSINOPHIL NFR BLD AUTO: 0.3 %
ERYTHROCYTE [DISTWIDTH] IN BLOOD BY AUTOMATED COUNT: 15 % (ref 11.5–14.5)
FLUAV RNA RESP QL NAA+PROBE: NOT DETECTED
FLUBV RNA RESP QL NAA+PROBE: NOT DETECTED
GLUCOSE SERPL-MCNC: 166 MG/DL (ref 74–99)
GLUCOSE UR STRIP.AUTO-MCNC: ABNORMAL MG/DL
HCT VFR BLD AUTO: 45.3 % (ref 36–46)
HGB BLD-MCNC: 14.9 G/DL (ref 12–16)
IMM GRANULOCYTES # BLD AUTO: 0.02 X10*3/UL (ref 0–0.5)
IMM GRANULOCYTES NFR BLD AUTO: 0.3 % (ref 0–0.9)
KETONES UR STRIP.AUTO-MCNC: NEGATIVE MG/DL
LEUKOCYTE ESTERASE UR QL STRIP.AUTO: NEGATIVE
LYMPHOCYTES # BLD AUTO: 1.63 X10*3/UL (ref 0.8–3)
LYMPHOCYTES NFR BLD AUTO: 21.6 %
MAGNESIUM SERPL-MCNC: 2.38 MG/DL (ref 1.6–2.4)
MCH RBC QN AUTO: 27.5 PG (ref 26–34)
MCHC RBC AUTO-ENTMCNC: 32.9 G/DL (ref 32–36)
MCV RBC AUTO: 84 FL (ref 80–100)
MONOCYTES # BLD AUTO: 0.43 X10*3/UL (ref 0.05–0.8)
MONOCYTES NFR BLD AUTO: 5.7 %
MUCOUS THREADS #/AREA URNS AUTO: ABNORMAL /LPF
NEUTROPHILS # BLD AUTO: 5.44 X10*3/UL (ref 1.6–5.5)
NEUTROPHILS NFR BLD AUTO: 71.8 %
NITRITE UR QL STRIP.AUTO: ABNORMAL
NRBC BLD-RTO: 0 /100 WBCS (ref 0–0)
PH UR STRIP.AUTO: 5 [PH]
PLATELET # BLD AUTO: 219 X10*3/UL (ref 150–450)
POTASSIUM SERPL-SCNC: 4.5 MMOL/L (ref 3.5–5.3)
PROT SERPL-MCNC: 7.6 G/DL (ref 6.4–8.2)
PROT UR STRIP.AUTO-MCNC: NEGATIVE MG/DL
RBC # BLD AUTO: 5.42 X10*6/UL (ref 4–5.2)
RBC # UR STRIP.AUTO: ABNORMAL MG/DL
RBC #/AREA URNS AUTO: ABNORMAL /HPF
RSV RNA RESP QL NAA+PROBE: NOT DETECTED
SARS-COV-2 RNA RESP QL NAA+PROBE: NOT DETECTED
SODIUM SERPL-SCNC: 135 MMOL/L (ref 136–145)
SP GR UR STRIP.AUTO: 1.01
UROBILINOGEN UR STRIP.AUTO-MCNC: ABNORMAL MG/DL
WBC # BLD AUTO: 7.6 X10*3/UL (ref 4.4–11.3)
WBC #/AREA URNS AUTO: ABNORMAL /HPF

## 2025-03-13 PROCEDURE — 87637 SARSCOV2&INF A&B&RSV AMP PRB: CPT | Performed by: PHYSICIAN ASSISTANT

## 2025-03-13 PROCEDURE — 2500000001 HC RX 250 WO HCPCS SELF ADMINISTERED DRUGS (ALT 637 FOR MEDICARE OP): Performed by: PHYSICIAN ASSISTANT

## 2025-03-13 PROCEDURE — 85025 COMPLETE CBC W/AUTO DIFF WBC: CPT | Performed by: STUDENT IN AN ORGANIZED HEALTH CARE EDUCATION/TRAINING PROGRAM

## 2025-03-13 PROCEDURE — 72125 CT NECK SPINE W/O DYE: CPT

## 2025-03-13 PROCEDURE — 36415 COLL VENOUS BLD VENIPUNCTURE: CPT | Performed by: STUDENT IN AN ORGANIZED HEALTH CARE EDUCATION/TRAINING PROGRAM

## 2025-03-13 PROCEDURE — 84484 ASSAY OF TROPONIN QUANT: CPT | Performed by: PHYSICIAN ASSISTANT

## 2025-03-13 PROCEDURE — 70450 CT HEAD/BRAIN W/O DYE: CPT

## 2025-03-13 PROCEDURE — 99285 EMERGENCY DEPT VISIT HI MDM: CPT | Mod: 25

## 2025-03-13 PROCEDURE — 80053 COMPREHEN METABOLIC PANEL: CPT | Performed by: STUDENT IN AN ORGANIZED HEALTH CARE EDUCATION/TRAINING PROGRAM

## 2025-03-13 PROCEDURE — 99285 EMERGENCY DEPT VISIT HI MDM: CPT | Performed by: PHYSICIAN ASSISTANT

## 2025-03-13 PROCEDURE — 87086 URINE CULTURE/COLONY COUNT: CPT | Mod: STJLAB | Performed by: PHYSICIAN ASSISTANT

## 2025-03-13 PROCEDURE — 71046 X-RAY EXAM CHEST 2 VIEWS: CPT | Mod: FOREIGN READ | Performed by: RADIOLOGY

## 2025-03-13 PROCEDURE — 81001 URINALYSIS AUTO W/SCOPE: CPT | Performed by: PHYSICIAN ASSISTANT

## 2025-03-13 PROCEDURE — 71046 X-RAY EXAM CHEST 2 VIEWS: CPT

## 2025-03-13 PROCEDURE — 83735 ASSAY OF MAGNESIUM: CPT | Performed by: PHYSICIAN ASSISTANT

## 2025-03-13 RX ORDER — CEPHALEXIN 500 MG/1
500 CAPSULE ORAL ONCE
Status: COMPLETED | OUTPATIENT
Start: 2025-03-13 | End: 2025-03-13

## 2025-03-13 RX ORDER — CEPHALEXIN 500 MG/1
500 CAPSULE ORAL 4 TIMES DAILY
Qty: 28 CAPSULE | Refills: 0 | Status: SHIPPED | OUTPATIENT
Start: 2025-03-13 | End: 2025-03-17 | Stop reason: WASHOUT

## 2025-03-13 RX ADMIN — CEPHALEXIN 500 MG: 500 CAPSULE ORAL at 17:17

## 2025-03-13 ASSESSMENT — PAIN SCALES - GENERAL: PAINLEVEL_OUTOF10: 0 - NO PAIN

## 2025-03-13 ASSESSMENT — PAIN - FUNCTIONAL ASSESSMENT: PAIN_FUNCTIONAL_ASSESSMENT: 0-10

## 2025-03-13 ASSESSMENT — COLUMBIA-SUICIDE SEVERITY RATING SCALE - C-SSRS
1. IN THE PAST MONTH, HAVE YOU WISHED YOU WERE DEAD OR WISHED YOU COULD GO TO SLEEP AND NOT WAKE UP?: NO
2. HAVE YOU ACTUALLY HAD ANY THOUGHTS OF KILLING YOURSELF?: NO
6. HAVE YOU EVER DONE ANYTHING, STARTED TO DO ANYTHING, OR PREPARED TO DO ANYTHING TO END YOUR LIFE?: NO

## 2025-03-13 NOTE — ED PROVIDER NOTES
"Emergency Department Encounter  [facility] EMERGENCY MEDICINE    Patient: Daron Burrell  MRN: [id number]  : 1940  Date of Evaluation: 3/13/2025  ED Provider: Pricilla Gastelum PA-C        History of Present Illness     This is an 84-year-old female with past medical history of Alzheimer's dementia, diabetes, anemia, hypertension, hypomagnesia, hyponatremia who presents to the ED for generalized malaise.  Patient is a poor historian so history was limited.  Patient states that for the past 3 to 4 weeks she has been feeling very tired and has not been eating much.  She endorses dizziness that comes and goes.  She does endorse associated nausea as well as occasional vomiting.  Denies diarrhea.  Denies abdominal pain, denies urinary symptoms.  Does endorse occasional headaches.  Is unable to describe the headaches.  Denies visual changes, weakness, paresthesias or areas of decree sensation.  Patient denies any falls, however per review of the record she was seen by her primary care provider today and her grandson had stated she had a fall during that visit and she was referred here to the ED due to her symptoms as well as his fall.               Visit Vitals  /82 (BP Location: Left arm, Patient Position: Sitting)   Pulse 96   Temp 36.8 °C (98.2 °F) (Temporal)   Resp 18   Ht 1.651 m (5' 5\")   Wt 101 kg (223 lb)   SpO2 97%   BMI 37.11 kg/m²   Smoking Status Never   BSA 2.15 m²          Physical Exam       Triage vitals:  T 35.6 °C (96.1 °F)  HR 86  /69  RR 16  O2 97 % None (Room air)    Physical Exam     Physical exam:   General: Vitals noted, no distress. Afebrile.   EENT:  Hearing grossly intact. Normal phonation.  Dry mucous membranes. Airway patient. PERRL. EOMI.   Neck: No midline tenderness or paraspinal tenderness. FROM.   Cardiac: Regular, rate, rhythm. Normal S1 and S2.  No murmurs, gallops, rubs.   Pulmonary: Good air exchange. Lungs clear bilaterally. No wheezes, rhonchi, " rales. No accessory muscle use.   Abdomen: Soft, nonsurgical. Nontender. No peritoneal signs.   Back: No CVA tenderness. No midline tenderness or paraspinal tenderness. No obvious deformity or step off.   Extremities: No peripheral edema.  Full range of motion. Moves all extremities freely. No tenderness throughout extremities.   Skin: No rash. Warm and Dry.   Neuro: No focal neurologic deficits. CN 2-12 grossly intact. Sensation equal bilaterally. No weakness.  No ataxia.  Ambulatory with steady gait.      Results       Labs Reviewed   CBC WITH AUTO DIFFERENTIAL - Abnormal       Result Value    WBC 7.6      nRBC 0.0      RBC 5.42 (*)     Hemoglobin 14.9      Hematocrit 45.3      MCV 84      MCH 27.5      MCHC 32.9      RDW 15.0 (*)     Platelets 219      Neutrophils % 71.8      Immature Granulocytes %, Automated 0.3      Lymphocytes % 21.6      Monocytes % 5.7      Eosinophils % 0.3      Basophils % 0.3      Neutrophils Absolute 5.44      Immature Granulocytes Absolute, Automated 0.02      Lymphocytes Absolute 1.63      Monocytes Absolute 0.43      Eosinophils Absolute 0.02      Basophils Absolute 0.02     COMPREHENSIVE METABOLIC PANEL - Abnormal    Glucose 166 (*)     Sodium 135 (*)     Potassium 4.5      Chloride 102      Bicarbonate 21      Anion Gap 17      Urea Nitrogen 12      Creatinine 0.49 (*)     eGFR >90      Calcium 9.1      Albumin 4.2      Alkaline Phosphatase 84      Total Protein 7.6      AST 23      Bilirubin, Total 0.7      ALT 14     URINALYSIS WITH REFLEX CULTURE AND MICROSCOPIC - Abnormal    Color, Urine Yellow      Appearance, Urine Clear      Specific Gravity, Urine 1.010      pH, Urine 5.0      Protein, Urine NEGATIVE      Glucose, Urine 1000 (4+) (*)     Blood, Urine 0.03 (TRACE) (*)     Ketones, Urine NEGATIVE      Bilirubin, Urine NEGATIVE      Urobilinogen, Urine NORM      Nitrite, Urine 1+ (*)     Leukocyte Esterase, Urine NEGATIVE     MICROSCOPIC ONLY, URINE - Abnormal    WBC, Urine  1-5      RBC, Urine 6-10 (*)     Bacteria, Urine 1+ (*)     Mucus, Urine FEW     MAGNESIUM - Normal    Magnesium 2.38     TROPONIN I, HIGH SENSITIVITY - Normal    Troponin I, High Sensitivity 3      Narrative:     Less than 99th percentile of normal range cutoff-  Female and children under 18 years old <14 ng/L; Male <21 ng/L: Negative  Repeat testing should be performed if clinically indicated.     Female and children under 18 years old 14-50 ng/L; Male 21-50 ng/L:  Consistent with possible cardiac damage and possible increased clinical   risk. Serial measurements may help to assess extent of myocardial damage.     >50 ng/L: Consistent with cardiac damage, increased clinical risk and  myocardial infarction. Serial measurements may help assess extent of   myocardial damage.      NOTE: Children less than 1 year old may have higher baseline troponin   levels and results should be interpreted in conjunction with the overall   clinical context.     NOTE: Troponin I testing is performed using a different   testing methodology at [facility] than at [facility]. Direct result comparisons should only   be made within the same method.   SARS-COV-2, INFLUENZA A/B AND RSV PCR - Normal    Coronavirus 2019, PCR Not Detected      Flu A Result Not Detected      Flu B Result Not Detected      RSV PCR Not Detected      Narrative:     This assay is an FDA-cleared, in vitro diagnostic nucleic acid amplification test for the qualitative detection and differentiation of SARS CoV-2/ Influenza A/B/ RSV from nasopharyngeal specimens collected from individuals with signs and symptoms of respiratory tract infections, and has been validated for use at [facility]. Negative results do not preclude COVID-19/ Influenza A/B/ RSV infections and should not be used as the sole basis for diagnosis, treatment, or other management decisions. Testing for SARS CoV-2 is recommended only for patients who  meet current clinical and/or epidemiological criteria defined by federal, state, or local public health directives.   URINE CULTURE   URINALYSIS WITH REFLEX CULTURE AND MICROSCOPIC    Narrative:     The following orders were created for panel order Urinalysis with Reflex Culture and Microscopic.  Procedure                               Abnormality         Status                     ---------                               -----------         ------                     Urinalysis with Reflex C...[[id number]]  Abnormal            Final result               Extra Urine Gray Tube[[id number]]                            In process                   Please view results for these tests on the individual orders.   EXTRA URINE GRAY TUBE       CT head wo IV contrast   Final Result   Age-related atrophy and small-vessel ischemic changes of the cerebral   white matter.        No CT evidence of acute intracranial hemorrhage or mass effect.             MACRO:   None        Signed by: Demond Lowry 3/13/2025 3:18 PM   Dictation workstation:   [id number]      CT cervical spine wo IV contrast   Final Result   Multilevel cervical spondylosis and facet arthrosis with mild   degenerative anterior subluxation of C4 and C5.        No acute fracture or traumatic subluxation.        Multilevel bulging disc, greater at C3-4 with moderate central canal   narrowing. There are 2 foci of epidural air anteriorly on the left,   likely related to disc.        Multilevel bilateral neural foraminal narrowing as detailed above.             MACRO:   None        Signed by: Demond Lowry 3/13/2025 3:26 PM   Dictation workstation:   [id number]      XR chest 2 views   Final Result   Mildly prominent lung markings at the bases which are probably   chronic.  Clinical follow-up recommended with repeat x-ray if there   are abnormal auscultatory findings at the lung bases..   Signed by Kenney Cueva MD            Medical Decision Making & ED Course          ED Course & MDM     Medical Decision Making  This is an 84-year-old female with past medical history including dementia, diabetes, anemia, hypertension, hypomagnesia as well as hyponatremia who presents to the ED with several weeks of dizziness, decreased p.o. intake, generalized malaise as well as reported fall.  Vital stable upon arrival to the ED.  On examination history from the patient was limited.  Patient grossly neurologically intact without deficits.  No nystagmus or ataxia noted on exam.  No midline C, T, or L-spine tenderness.  Lungs clear to auscultation.  No audible cardiac murmurs.  Abdomen soft and nontender.  CT head and C-spine ordered due to reported fall and patient's dementia.  Chest x-ray as well as laboratory studies ordered.  UA ordered.  EKG ordered.  Low suspicion for acute stroke causing patient's dizziness as it appears she has had multiple previous visits for chronic dizziness.  I called the patient's family who states that she does have significant baseline confusion secondary to her dementia.  They do feel she has sufficient care at home between them as well as a caretaker.  Patient's workup today was concerning for UTI.  She was ordered Keflex for this.  Troponin normal at 3.  Magnesium normal 2.38.  Negative viral swabs.  CMP overall grossly unremarkable.  CBC also grossly unremarkable.  CT head showed no acute intercranial bleed or skull fracture.  CT C-spine showed no acute fracture or subluxation.  Chest x-ray with mildly prominent lung markings at the bases, likely chronic.  Patient had no adventitious lung sounds on exam.  Low clinical suspicion for pneumonia based on patient's exam, vitals, and this x-ray.  Patient was able to ambulate here in the ED without assistance.  I called the patient's family who did feel comfortable caring for her at home with this UTI.  She was written a petition for Keflex to go home with.  Patient's family had no further questions at this  time.  These results were discussed with the patient as well.  She was discharged from the ED in stable condition with instructions to follow-up with her primary care provider.    Risk  Prescription drug management.         Diagnoses as of 03/13/25 1720   UTI (urinary tract infection), uncomplicated            Independent Result Review and Interpretation: CT head as interpreted by me revealed no visualized intercranial bleed or skull fracture.          Disposition   As a result of the work-up, the patient was discharged home.  she was informed of her diagnosis and instructed to come back with any concerns or worsening of condition.  she and was agreeable to the plan as discussed above.  she was given the opportunity to ask questions.  All of the patient's questions were answered.    Procedures     Patient was seen independently    Procedures    Pricilla Gastelum PA-C  Emergency Medicine      Pricilla Gastelum PA-C  03/13/25 1720

## 2025-03-14 LAB — HOLD SPECIMEN: NORMAL

## 2025-03-16 LAB — BACTERIA UR CULT: ABNORMAL

## 2025-03-17 ENCOUNTER — TELEPHONE (OUTPATIENT)
Dept: PHARMACY | Facility: HOSPITAL | Age: 85
End: 2025-03-17
Payer: COMMERCIAL

## 2025-03-17 NOTE — PROGRESS NOTES
EDPD Note: Rapid Result Review    I reviewed Daron Burrell 's chart regarding a positive urine culture/result that was taken during their recent emergency room visit. The patient was not told about these results prior to leaving the emergency department. Therefore, patient was contacted and given appropriate education.     Patient presented in ED on 3/13 for generalized malaise. Patient has alzheimer' dementia. Denied any urinary symptoms. Was discharged with Keflex 500 mg QID x 7 days, which is resident. Patient's granddaughter report patient does not endorse any urinary symptoms, stating that she has been refusing antibiotics jurgen she believe she does not an UTI. Direct granddaughter she stopped antibiotic use due to no treatment is needed based on  Guideline for asymptomatic UTI and follow up with PCP if further evaluation is needed.    Susceptibility data from last 90 days.  Collected Specimen Info Organism Amoxicillin/Clavulanate Ampicillin Ampicillin/Sulbactam Aztreonam Cefazolin Cefazolin (uncomplicated UTIs only) Cefepime Cefotaxime Ceftazidime Ceftriaxone Ciprofloxacin Ertapenem   03/13/25 Urine from Clean Catch/Voided Escherichia coli  S  R  S  R  R  R  R  R  R  R  S  S     Collected Specimen Info Organism Gentamicin Meropenem Nitrofurantoin Piperacillin/Tazobactam Trimethoprim/Sulfamethoxazole   03/13/25 Urine from Clean Catch/Voided Escherichia coli  S  S  S  S  R     Admission on 03/13/2025, Discharged on 03/13/2025   Component Date Value Ref Range Status    WBC 03/13/2025 7.6  4.4 - 11.3 x10*3/uL Final    nRBC 03/13/2025 0.0  0.0 - 0.0 /100 WBCs Final    RBC 03/13/2025 5.42 (H)  4.00 - 5.20 x10*6/uL Final    Hemoglobin 03/13/2025 14.9  12.0 - 16.0 g/dL Final    Hematocrit 03/13/2025 45.3  36.0 - 46.0 % Final    MCV 03/13/2025 84  80 - 100 fL Final    MCH 03/13/2025 27.5  26.0 - 34.0 pg Final    MCHC 03/13/2025 32.9  32.0 - 36.0 g/dL Final    RDW 03/13/2025 15.0 (H)  11.5 - 14.5 % Final    Platelets  03/13/2025 219  150 - 450 x10*3/uL Final    Neutrophils % 03/13/2025 71.8  40.0 - 80.0 % Final    Immature Granulocytes %, Automated 03/13/2025 0.3  0.0 - 0.9 % Final    Immature Granulocyte Count (IG) includes promyelocytes, myelocytes and metamyelocytes but does not include bands. Percent differential counts (%) should be interpreted in the context of the absolute cell counts (cells/UL).    Lymphocytes % 03/13/2025 21.6  13.0 - 44.0 % Final    Monocytes % 03/13/2025 5.7  2.0 - 10.0 % Final    Eosinophils % 03/13/2025 0.3  0.0 - 6.0 % Final    Basophils % 03/13/2025 0.3  0.0 - 2.0 % Final    Neutrophils Absolute 03/13/2025 5.44  1.60 - 5.50 x10*3/uL Final    Percent differential counts (%) should be interpreted in the context of the absolute cell counts (cells/uL).    Immature Granulocytes Absolute, Au* 03/13/2025 0.02  0.00 - 0.50 x10*3/uL Final    Lymphocytes Absolute 03/13/2025 1.63  0.80 - 3.00 x10*3/uL Final    Monocytes Absolute 03/13/2025 0.43  0.05 - 0.80 x10*3/uL Final    Eosinophils Absolute 03/13/2025 0.02  0.00 - 0.40 x10*3/uL Final    Basophils Absolute 03/13/2025 0.02  0.00 - 0.10 x10*3/uL Final    Glucose 03/13/2025 166 (H)  74 - 99 mg/dL Final    Sodium 03/13/2025 135 (L)  136 - 145 mmol/L Final    Potassium 03/13/2025 4.5  3.5 - 5.3 mmol/L Final    MILD HEMOLYSIS DETECTED. The result may be falsely elevated due to hemolysis or other interferents. Clinical correlation is recommended. Repeat testing may be considered.    Chloride 03/13/2025 102  98 - 107 mmol/L Final    Bicarbonate 03/13/2025 21  21 - 32 mmol/L Final    Anion Gap 03/13/2025 17  10 - 20 mmol/L Final    Urea Nitrogen 03/13/2025 12  6 - 23 mg/dL Final    Creatinine 03/13/2025 0.49 (L)  0.50 - 1.05 mg/dL Final    eGFR 03/13/2025 >90  >60 mL/min/1.73m*2 Final    Calculations of estimated GFR are performed using the 2021 CKD-EPI Study Refit equation without the race variable for the IDMS-Traceable creatinine  methods.  https://jasn.asnjournals.org/content/early/2021/09/22/ASN.2833041490    Calcium 03/13/2025 9.1  8.6 - 10.3 mg/dL Final    Albumin 03/13/2025 4.2  3.4 - 5.0 g/dL Final    Alkaline Phosphatase 03/13/2025 84  33 - 136 U/L Final    Total Protein 03/13/2025 7.6  6.4 - 8.2 g/dL Final    AST 03/13/2025 23  9 - 39 U/L Final    MILD HEMOLYSIS DETECTED. The result may be falsely elevated due to hemolysis or other interferents. Clinical correlation is recommended. Repeat testing may be considered.    Bilirubin, Total 03/13/2025 0.7  0.0 - 1.2 mg/dL Final    ALT 03/13/2025 14  7 - 45 U/L Final    Patients treated with Sulfasalazine may generate falsely decreased results for ALT.    Magnesium 03/13/2025 2.38  1.60 - 2.40 mg/dL Final    MILD HEMOLYSIS DETECTED. The result may be falsely elevated due to hemolysis or other interferents. Clinical correlation is recommended. Repeat testing may be considered.    Troponin I, High Sensitivity 03/13/2025 3  0 - 13 ng/L Final    Coronavirus 2019, PCR 03/13/2025 Not Detected  Not Detected Final    Flu A Result 03/13/2025 Not Detected  Not Detected Final    Flu B Result 03/13/2025 Not Detected  Not Detected Final    RSV PCR 03/13/2025 Not Detected  Not Detected Final    Color, Urine 03/13/2025 Yellow  Straw, Yellow Final    Appearance, Urine 03/13/2025 Clear  Clear Final    Specific Gravity, Urine 03/13/2025 1.010  1.005 - 1.035 Final    pH, Urine 03/13/2025 5.0  5.0, 5.5, 6.0, 6.5, 7.0, 7.5, 8.0 Final    Protein, Urine 03/13/2025 NEGATIVE  NEGATIVE, 10 (TRACE) mg/dL Final    Glucose, Urine 03/13/2025 1000 (4+) (A)  NEGATIVE mg/dL Final    Blood, Urine 03/13/2025 0.03 (TRACE) (A)  NEGATIVE mg/dL Final    Ketones, Urine 03/13/2025 NEGATIVE  NEGATIVE mg/dL Final    Bilirubin, Urine 03/13/2025 NEGATIVE  NEGATIVE mg/dL Final    Urobilinogen, Urine 03/13/2025 NORM  NORM mg/dL Final    Nitrite, Urine 03/13/2025 1+ (A)  NEGATIVE Final    Leukocyte Esterase, Urine 03/13/2025 NEGATIVE   NEGATIVE Final    Extra Tube 03/13/2025 Hold for add-ons.   Final    Auto resulted.    WBC, Urine 03/13/2025 1-5  1-5, NONE /HPF Final    RBC, Urine 03/13/2025 6-10 (A)  NONE, 1-2, 3-5 /HPF Final    Bacteria, Urine 03/13/2025 1+ (A)  NONE SEEN /HPF Final    Mucus, Urine 03/13/2025 FEW  Reference range not established. /LPF Final    Urine Culture 03/13/2025 >=100,000 CFU/mL Escherichia coli (A)   Final    Extended Spectrum Beta Lactamase (ESBL) producing organism       No further follow up needed from EDPD Team.     If there are any other questions for the ED Post-Discharge Culture Follow Up Team, please contact [phone]. Fax: [phone].    Gris Khan RPh

## 2025-03-26 DIAGNOSIS — E11.9 TYPE 2 DIABETES MELLITUS WITHOUT COMPLICATION, WITHOUT LONG-TERM CURRENT USE OF INSULIN: Chronic | ICD-10-CM

## 2025-03-27 RX ORDER — DAPAGLIFLOZIN 10 MG/1
10 TABLET, FILM COATED ORAL DAILY
Qty: 30 TABLET | Refills: 5 | Status: SHIPPED | OUTPATIENT
Start: 2025-03-27 | End: 2025-09-23

## 2025-04-15 ENCOUNTER — APPOINTMENT (OUTPATIENT)
Dept: PRIMARY CARE | Facility: CLINIC | Age: 85
End: 2025-04-15
Payer: COMMERCIAL

## 2025-04-15 VITALS
TEMPERATURE: 97.5 F | BODY MASS INDEX: 37.11 KG/M2 | DIASTOLIC BLOOD PRESSURE: 68 MMHG | SYSTOLIC BLOOD PRESSURE: 120 MMHG | WEIGHT: 223 LBS

## 2025-04-15 DIAGNOSIS — F32.A DEPRESSION, UNSPECIFIED DEPRESSION TYPE: ICD-10-CM

## 2025-04-15 DIAGNOSIS — F02.B3 MODERATE ALZHEIMER'S DEMENTIA WITH MOOD DISTURBANCE, UNSPECIFIED TIMING OF DEMENTIA ONSET (MULTI): ICD-10-CM

## 2025-04-15 DIAGNOSIS — E11.9 TYPE 2 DIABETES MELLITUS WITHOUT COMPLICATION, WITHOUT LONG-TERM CURRENT USE OF INSULIN: Chronic | ICD-10-CM

## 2025-04-15 DIAGNOSIS — D50.9 IRON DEFICIENCY ANEMIA, UNSPECIFIED IRON DEFICIENCY ANEMIA TYPE: Primary | ICD-10-CM

## 2025-04-15 DIAGNOSIS — G30.9 MODERATE ALZHEIMER'S DEMENTIA WITH MOOD DISTURBANCE, UNSPECIFIED TIMING OF DEMENTIA ONSET (MULTI): ICD-10-CM

## 2025-04-15 PROCEDURE — 3078F DIAST BP <80 MM HG: CPT

## 2025-04-15 PROCEDURE — G2211 COMPLEX E/M VISIT ADD ON: HCPCS

## 2025-04-15 PROCEDURE — 99214 OFFICE O/P EST MOD 30 MIN: CPT

## 2025-04-15 PROCEDURE — 3074F SYST BP LT 130 MM HG: CPT

## 2025-04-15 RX ORDER — SERTRALINE HYDROCHLORIDE 25 MG/1
TABLET, FILM COATED ORAL
Qty: 113 TABLET | Refills: 0 | Status: SHIPPED | OUTPATIENT
Start: 2025-04-15 | End: 2025-04-15 | Stop reason: SDUPTHER

## 2025-04-15 RX ORDER — SERTRALINE HYDROCHLORIDE 25 MG/1
TABLET, FILM COATED ORAL
Qty: 173 TABLET | Refills: 0 | Status: SHIPPED | OUTPATIENT
Start: 2025-04-15 | End: 2025-07-14

## 2025-04-15 RX ORDER — DAPAGLIFLOZIN 10 MG/1
10 TABLET, FILM COATED ORAL DAILY
Qty: 30 TABLET | Refills: 5 | Status: SHIPPED | OUTPATIENT
Start: 2025-04-15 | End: 2025-10-12

## 2025-04-15 NOTE — ASSESSMENT & PLAN NOTE
Suspect agitation and unfriendly as likely secondary to underlying dementia disease  Start sertraline 25 mg with up titration to 50mg in 1 week.  Will follow-up at next diabetes appointment in 3 months time  Repeat BMP to ensure no hyponatremia associated with initiation of SSRI.

## 2025-04-15 NOTE — PROGRESS NOTES
Subjective   Patient ID: Daron Burrell is a 85 y.o. female who presents for 2 month follow up.    Patient here today to follow-up regarding diabetes  No acute concerns at this time, overall patient's weight is stable.  He is tolerating Farxiga and metformin well.  Here today with son.  Concerned about patient's mood.  Multiple family members have raise concern regarding patient's irritation/agitation.  Is overall stable.  Have broached topic of antidepressant in the past, patient's son at considered and is wishing to trial SSRI at this time.  Since last in office visit patient has had visit to emergency department for suspected UTI.  Urine culture subsequently was negative.  Discontinue antibiotics.         Review of Systems    Objective   /68 (BP Location: Left arm, Patient Position: Sitting)   Temp 36.4 °C (97.5 °F)   Wt 101 kg (223 lb)   BMI 37.11 kg/m²     Physical Exam  Constitutional:       General: She is not in acute distress.     Appearance: Normal appearance. She is not ill-appearing.   Eyes:      General: No scleral icterus.  Cardiovascular:      Rate and Rhythm: Normal rate and regular rhythm.      Heart sounds: No murmur heard.     No friction rub. No gallop.   Pulmonary:      Effort: Pulmonary effort is normal. No respiratory distress.      Breath sounds: Normal breath sounds. No wheezing, rhonchi or rales.   Musculoskeletal:      Right lower leg: No edema.      Left lower leg: No edema.   Neurological:      General: No focal deficit present.      Mental Status: She is alert and oriented to person, place, and time.   Psychiatric:         Mood and Affect: Mood normal.         Behavior: Behavior normal.         Assessment/Plan   Problem List Items Addressed This Visit             ICD-10-CM    Type 2 diabetes mellitus without complication, without long-term current use of insulin (Chronic) E11.9     Repeat A1c at today's visit, will adjust medications accordingly.  Given advanced age and  moderate dementia goal A1c approximately 8%  As tolerated glycemic controlling medications well.         Relevant Medications    dapagliflozin propanediol (Farxiga) 10 mg tablet    Other Relevant Orders    Albumin-Creatinine Ratio, Urine Random    Hemoglobin A1c    Iron deficiency anemia - Primary D50.9     Low suspicion for chronic blood loss resulting in anemia as patient's red blood cell counts have incremented appropriately.  Suspect dietary etiology, continue with iron supplementation.  Repeat ferritin/iron TIBC             Relevant Orders    Ferritin    Iron and TIBC    Moderate Alzheimer's dementia with mood disturbance (Multi) G30.9, F02.B3     Suspect agitation and unfriendly as likely secondary to underlying dementia disease  Start sertraline 25 mg with up titration to 50mg in 1 week.  Will follow-up at next diabetes appointment in 3 months time  Repeat BMP to ensure no hyponatremia associated with initiation of SSRI.         Relevant Medications    sertraline (Zoloft) 25 mg tablet    Depression F32.A     See Alzheimer's diagnosis         Relevant Medications    sertraline (Zoloft) 25 mg tablet     Follow-up in 3 months for diabetes visit/medication management regarding sertraline.  Complete BMP at this visit    Fabian Narveaz DO

## 2025-04-15 NOTE — ASSESSMENT & PLAN NOTE
Low suspicion for chronic blood loss resulting in anemia as patient's red blood cell counts have incremented appropriately.  Suspect dietary etiology, continue with iron supplementation.  Repeat ferritin/iron TIBC

## 2025-04-15 NOTE — ASSESSMENT & PLAN NOTE
Repeat A1c at today's visit, will adjust medications accordingly.  Given advanced age and moderate dementia goal A1c approximately 8%  As tolerated glycemic controlling medications well.

## 2025-04-16 LAB
ALBUMIN/CREAT UR: 19 MG/G CREAT
CREAT UR-MCNC: 36 MG/DL (ref 20–275)
EST. AVERAGE GLUCOSE BLD GHB EST-MCNC: 203 MG/DL
EST. AVERAGE GLUCOSE BLD GHB EST-SCNC: 11.2 MMOL/L
FERRITIN SERPL-MCNC: 16 NG/ML (ref 16–288)
HBA1C MFR BLD: 8.7 %
IRON SATN MFR SERPL: 28 % (CALC) (ref 16–45)
IRON SERPL-MCNC: 83 MCG/DL (ref 45–160)
MICROALBUMIN UR-MCNC: 0.7 MG/DL
TIBC SERPL-MCNC: 292 MCG/DL (CALC) (ref 250–450)

## 2025-05-12 ENCOUNTER — APPOINTMENT (OUTPATIENT)
Dept: CARDIOLOGY | Facility: HOSPITAL | Age: 85
End: 2025-05-12
Payer: COMMERCIAL

## 2025-05-12 ENCOUNTER — HOSPITAL ENCOUNTER (EMERGENCY)
Facility: HOSPITAL | Age: 85
Discharge: HOME | End: 2025-05-12
Attending: EMERGENCY MEDICINE
Payer: COMMERCIAL

## 2025-05-12 ENCOUNTER — APPOINTMENT (OUTPATIENT)
Dept: RADIOLOGY | Facility: HOSPITAL | Age: 85
End: 2025-05-12
Payer: COMMERCIAL

## 2025-05-12 VITALS
OXYGEN SATURATION: 95 % | HEART RATE: 114 BPM | SYSTOLIC BLOOD PRESSURE: 138 MMHG | HEIGHT: 65 IN | BODY MASS INDEX: 25.09 KG/M2 | DIASTOLIC BLOOD PRESSURE: 86 MMHG | WEIGHT: 150.57 LBS | TEMPERATURE: 95.7 F | RESPIRATION RATE: 18 BRPM

## 2025-05-12 DIAGNOSIS — R11.2 NAUSEA AND VOMITING, UNSPECIFIED VOMITING TYPE: ICD-10-CM

## 2025-05-12 DIAGNOSIS — R42 DIZZINESS: Primary | ICD-10-CM

## 2025-05-12 LAB
ALBUMIN SERPL BCP-MCNC: 4.2 G/DL (ref 3.4–5)
ALP SERPL-CCNC: 71 U/L (ref 33–136)
ALT SERPL W P-5'-P-CCNC: 13 U/L (ref 7–45)
ANION GAP SERPL CALC-SCNC: 16 MMOL/L (ref 10–20)
APPEARANCE UR: CLEAR
AST SERPL W P-5'-P-CCNC: 12 U/L (ref 9–39)
BASOPHILS # BLD AUTO: 0.03 X10*3/UL (ref 0–0.1)
BASOPHILS NFR BLD AUTO: 0.4 %
BILIRUB SERPL-MCNC: 0.6 MG/DL (ref 0–1.2)
BILIRUB UR STRIP.AUTO-MCNC: NEGATIVE MG/DL
BUN SERPL-MCNC: 13 MG/DL (ref 6–23)
CALCIUM SERPL-MCNC: 9.4 MG/DL (ref 8.6–10.3)
CARDIAC TROPONIN I PNL SERPL HS: 3 NG/L (ref 0–13)
CHLORIDE SERPL-SCNC: 97 MMOL/L (ref 98–107)
CO2 SERPL-SCNC: 24 MMOL/L (ref 21–32)
COLOR UR: COLORLESS
CREAT SERPL-MCNC: 0.42 MG/DL (ref 0.5–1.05)
EGFRCR SERPLBLD CKD-EPI 2021: >90 ML/MIN/1.73M*2
EOSINOPHIL # BLD AUTO: 0.04 X10*3/UL (ref 0–0.4)
EOSINOPHIL NFR BLD AUTO: 0.6 %
ERYTHROCYTE [DISTWIDTH] IN BLOOD BY AUTOMATED COUNT: 13.7 % (ref 11.5–14.5)
GLUCOSE SERPL-MCNC: 123 MG/DL (ref 74–99)
GLUCOSE UR STRIP.AUTO-MCNC: ABNORMAL MG/DL
HCT VFR BLD AUTO: 46.9 % (ref 36–46)
HGB BLD-MCNC: 15.9 G/DL (ref 12–16)
IMM GRANULOCYTES # BLD AUTO: 0.01 X10*3/UL (ref 0–0.5)
IMM GRANULOCYTES NFR BLD AUTO: 0.1 % (ref 0–0.9)
KETONES UR STRIP.AUTO-MCNC: NEGATIVE MG/DL
LEUKOCYTE ESTERASE UR QL STRIP.AUTO: NEGATIVE
LIPASE SERPL-CCNC: 56 U/L (ref 9–82)
LYMPHOCYTES # BLD AUTO: 2.15 X10*3/UL (ref 0.8–3)
LYMPHOCYTES NFR BLD AUTO: 31.4 %
MAGNESIUM SERPL-MCNC: 2.18 MG/DL (ref 1.6–2.4)
MCH RBC QN AUTO: 29.3 PG (ref 26–34)
MCHC RBC AUTO-ENTMCNC: 33.9 G/DL (ref 32–36)
MCV RBC AUTO: 86 FL (ref 80–100)
MONOCYTES # BLD AUTO: 0.4 X10*3/UL (ref 0.05–0.8)
MONOCYTES NFR BLD AUTO: 5.8 %
NEUTROPHILS # BLD AUTO: 4.21 X10*3/UL (ref 1.6–5.5)
NEUTROPHILS NFR BLD AUTO: 61.7 %
NITRITE UR QL STRIP.AUTO: NEGATIVE
NRBC BLD-RTO: 0 /100 WBCS (ref 0–0)
PH UR STRIP.AUTO: 5.5 [PH]
PLATELET # BLD AUTO: 204 X10*3/UL (ref 150–450)
POTASSIUM SERPL-SCNC: 3.9 MMOL/L (ref 3.5–5.3)
PROT SERPL-MCNC: 7.3 G/DL (ref 6.4–8.2)
PROT UR STRIP.AUTO-MCNC: NEGATIVE MG/DL
RBC # BLD AUTO: 5.43 X10*6/UL (ref 4–5.2)
RBC # UR STRIP.AUTO: NEGATIVE MG/DL
SODIUM SERPL-SCNC: 133 MMOL/L (ref 136–145)
SP GR UR STRIP.AUTO: 1.01
UROBILINOGEN UR STRIP.AUTO-MCNC: NORMAL MG/DL
WBC # BLD AUTO: 6.8 X10*3/UL (ref 4.4–11.3)

## 2025-05-12 PROCEDURE — 70450 CT HEAD/BRAIN W/O DYE: CPT | Performed by: RADIOLOGY

## 2025-05-12 PROCEDURE — 85025 COMPLETE CBC W/AUTO DIFF WBC: CPT | Performed by: EMERGENCY MEDICINE

## 2025-05-12 PROCEDURE — 2500000004 HC RX 250 GENERAL PHARMACY W/ HCPCS (ALT 636 FOR OP/ED): Mod: JZ

## 2025-05-12 PROCEDURE — 70450 CT HEAD/BRAIN W/O DYE: CPT

## 2025-05-12 PROCEDURE — 83735 ASSAY OF MAGNESIUM: CPT | Performed by: EMERGENCY MEDICINE

## 2025-05-12 PROCEDURE — 99285 EMERGENCY DEPT VISIT HI MDM: CPT | Mod: 25 | Performed by: EMERGENCY MEDICINE

## 2025-05-12 PROCEDURE — 81003 URINALYSIS AUTO W/O SCOPE: CPT | Performed by: EMERGENCY MEDICINE

## 2025-05-12 PROCEDURE — 84484 ASSAY OF TROPONIN QUANT: CPT

## 2025-05-12 PROCEDURE — 96374 THER/PROPH/DIAG INJ IV PUSH: CPT

## 2025-05-12 PROCEDURE — 93005 ELECTROCARDIOGRAM TRACING: CPT

## 2025-05-12 PROCEDURE — 83690 ASSAY OF LIPASE: CPT | Performed by: EMERGENCY MEDICINE

## 2025-05-12 PROCEDURE — 36415 COLL VENOUS BLD VENIPUNCTURE: CPT | Performed by: EMERGENCY MEDICINE

## 2025-05-12 PROCEDURE — 84075 ASSAY ALKALINE PHOSPHATASE: CPT | Performed by: EMERGENCY MEDICINE

## 2025-05-12 PROCEDURE — 80053 COMPREHEN METABOLIC PANEL: CPT | Performed by: EMERGENCY MEDICINE

## 2025-05-12 RX ORDER — ONDANSETRON 4 MG/1
4 TABLET, ORALLY DISINTEGRATING ORAL EVERY 8 HOURS PRN
Qty: 15 TABLET | Refills: 0 | Status: SHIPPED | OUTPATIENT
Start: 2025-05-12

## 2025-05-12 RX ORDER — ONDANSETRON HYDROCHLORIDE 2 MG/ML
4 INJECTION, SOLUTION INTRAVENOUS ONCE
Status: COMPLETED | OUTPATIENT
Start: 2025-05-12 | End: 2025-05-12

## 2025-05-12 RX ORDER — MECLIZINE HYDROCHLORIDE 25 MG/1
12.5 TABLET ORAL 3 TIMES DAILY PRN
Qty: 10 TABLET | Refills: 0 | Status: SHIPPED | OUTPATIENT
Start: 2025-05-12 | End: 2025-05-22

## 2025-05-12 RX ADMIN — ONDANSETRON 4 MG: 2 INJECTION INTRAMUSCULAR; INTRAVENOUS at 17:07

## 2025-05-12 ASSESSMENT — LIFESTYLE VARIABLES
EVER HAD A DRINK FIRST THING IN THE MORNING TO STEADY YOUR NERVES TO GET RID OF A HANGOVER: NO
HAVE PEOPLE ANNOYED YOU BY CRITICIZING YOUR DRINKING: NO
EVER FELT BAD OR GUILTY ABOUT YOUR DRINKING: NO

## 2025-05-12 ASSESSMENT — COLUMBIA-SUICIDE SEVERITY RATING SCALE - C-SSRS
6. HAVE YOU EVER DONE ANYTHING, STARTED TO DO ANYTHING, OR PREPARED TO DO ANYTHING TO END YOUR LIFE?: NO
2. HAVE YOU ACTUALLY HAD ANY THOUGHTS OF KILLING YOURSELF?: NO
1. IN THE PAST MONTH, HAVE YOU WISHED YOU WERE DEAD OR WISHED YOU COULD GO TO SLEEP AND NOT WAKE UP?: NO

## 2025-05-12 ASSESSMENT — PAIN - FUNCTIONAL ASSESSMENT: PAIN_FUNCTIONAL_ASSESSMENT: 0-10

## 2025-05-12 NOTE — ED PROVIDER NOTES
Emergency Department Provider Note        History of Present Illness     History provided by: Patient and Family Member  Limitations to History: Dementia  External Records Reviewed with Brief Summary: Outpatient progress note from 4/15/2025 which showed primary care visit  As well as prior ED provider note from 2/13  HPI:  Daron Burrell is a 85 y.o. female past medical history significant for Alzheimer's dementia, type 2 diabetes, hyperlipidemia, hypothyroidism, hypertension.  Patient states she has had dizziness and nausea since last night.  Family member at bedside helping with history, patient has caregivers that help take care of her at home and states that she was complaining of dizziness and nausea throughout the day.  There was no falls and no vomiting.  Patient has had this complaint many times and wants months/years.  Patient does have some fullness in the ears secondary to bilateral impacted cerumen.  Patient otherwise denying any headache, chest pain, shortness of breath, abdominal pain, pain or discomfort with urination, constipation/diarrhea, numbness tingling or weakness in extremities.  Patient ambulates by self.    Physical Exam   Triage vitals:  T 35.8 °C (96.4 °F)  HR 83  /69  RR 16  O2 96 % None (Room air)    Physical Exam  Vitals and nursing note reviewed.   Constitutional:       Appearance: Normal appearance.   HENT:      Head: Normocephalic and atraumatic.      Right Ear: There is impacted cerumen.      Left Ear: There is impacted cerumen.      Nose: Nose normal.      Mouth/Throat:      Mouth: Mucous membranes are moist.   Eyes:      Pupils: Pupils are equal, round, and reactive to light.   Cardiovascular:      Rate and Rhythm: Regular rhythm.      Pulses: Normal pulses.      Heart sounds: Normal heart sounds.   Pulmonary:      Effort: Pulmonary effort is normal.   Abdominal:      General: Abdomen is flat. Bowel sounds are normal.      Palpations: Abdomen is soft.      Tenderness:  There is no abdominal tenderness. There is no guarding.   Musculoskeletal:         General: Normal range of motion.      Cervical back: Normal range of motion.   Skin:     General: Skin is warm.      Capillary Refill: Capillary refill takes less than 2 seconds.   Neurological:      Mental Status: She is alert. Mental status is at baseline.          Medical Decision Making & ED Course   Medical Decision Makin y.o. female with HPI and past medical history as described above presenting with nonspecific dizziness/lightheadedness as well as nausea.  Will obtain CBC, CMP, UA, mag, troponin as well as use Zofran initially for her nausea.  Patient has difficulty clarifying what dizziness feels to her.  Patient has no nystagmus and no ataxia, unlikely posterior circulation stroke.  Patient does have a history of paroxysmal vertigo, this is likely an episode of the.  Patient does feel significant improvement after Zofran.  However given vertiginous symptoms will obtain CT head.  Troponin unremarkable, UA negative for UTI, lipase, mag within normal limits, CBC largely unremarkable, CMP showing mild hyponatremia and hypochloremia that is insignificant.  CT head notable for no acute intracranial abnormalities.  Patient discharged with meclizine and Zofran for symptomatic control and advised to follow-up with primary care provider.  ----    Differential diagnoses considered include but are not limited to: Vertigo, Ménière's disease, BPPV, ocular neuritis, impacted cerumen, gastroenteritis, posterior circulation stroke.     Social Determinants of Health which Significantly Impact Care: None identified The following actions were taken to address these social determinants: n/a    EKG Independent Interpretation: EKG interpreted by myself. Please see ED Course for full interpretation.    Independent Result Review and Interpretation: Relevant laboratory and radiographic results were reviewed and independently interpreted by  myself.  As necessary, they are commented on in the ED Course.    Chronic conditions affecting the patient's care: As documented above in MDM    The patient was discussed with the following consultants/services: None    Care Considerations: As documented above in MDM    ED Course:  ED Course as of 05/13/25 0052   Mon May 12, 2025   1905 EKG on my interpretation showing normal sinus rhythm with ventricular rate of 92, poor quality in V2 and aVR, but no significant ST segment elevation or depressions, there is inverted T waves in V1 and lead III, intervals otherwise appropriate, left axis deviation noted. [IS]   2110 CT head wo IV contrast  1. No acute intracranial abnormality identified. [IS]      ED Course User Index  [IS] Harpreet Love MD         Diagnoses as of 05/13/25 0052   Dizziness   Nausea and vomiting, unspecified vomiting type     Disposition   Discharge    Procedures   Procedures    Patient seen and discussed with ED attending physician.    Harpreet Love MD  Emergency Medicine    =================Attending note===============    The patient was seen by the resident/fellow.  I have personally performed a substantive portion of the encounter.  I have seen and examined the patient; agree with the workup, evaluation, MDM,   management and diagnosis.  The care plan has been discussed with the resident; I have reviewed the resident’s note and agree with the documented findings.      This is a 85 y.o. female who presents to ER with dizziness that she awoke with around 8 AM.  Her daughter states that she has frequent vertiginous dizziness.  She frequently complains of the room spinning.  She states it occurs about monthly.  She does have a history of dementia and diabetes and hypertension.  There is no syncope.  She denies lightheadedness.  There is no chest pain or shortness of breath.  She does complain of occasional headaches.  No ataxia.  She woke up with the symptoms at 8 AM.  She would not be a brain  attack candidate for this.  Heart is regular.  Lungs are clear.  No carotid bruit.  Abdomen soft and nontender.  PERRLA.  EOMI.  No nystagmus.  No ataxia with finger-to-nose or heel-to-shin.  No pronator drift.  No drift in the legs.  She is awake and conversant.    EKG: Normal sinus rhythm with a ventricular rate of 92.  .  QTc 462.  No ST changes.    White count normal.  No anemia.  Glucose 123.  Sodium 133.  Lipase normal.  Troponin negative.  Magnesium normal.        ==========================================       Harpreet Love MD  Resident  05/13/25 2676

## 2025-05-13 LAB
ATRIAL RATE: 92 BPM
HOLD SPECIMEN: NORMAL
P AXIS: 25 DEGREES
P OFFSET: 189 MS
P ONSET: 143 MS
PR INTERVAL: 142 MS
Q ONSET: 214 MS
QRS COUNT: 15 BEATS
QRS DURATION: 84 MS
QT INTERVAL: 374 MS
QTC CALCULATION(BAZETT): 462 MS
QTC FREDERICIA: 431 MS
R AXIS: -39 DEGREES
T AXIS: 4 DEGREES
T OFFSET: 401 MS
VENTRICULAR RATE: 92 BPM

## 2025-05-13 NOTE — DISCHARGE INSTRUCTIONS
Atomoxetine Approved      Filling Pharmacy: [facility] [id number] [place], [place]          Seek immediate medical attention if you develop: worsening headache, worsening dizziness, nausea, vomiting, confusion, weakness, loss of motion in your arms or legs, loss of control of your urine or stool, difficulty waking from sleep, neck pain, fever, chest pain, shortness of breath, lightheadedness, passing out, or any new or worsening symptoms.

## 2025-05-19 LAB
ATRIAL RATE: 92 BPM
P AXIS: 25 DEGREES
P OFFSET: 189 MS
P ONSET: 143 MS
PR INTERVAL: 142 MS
Q ONSET: 214 MS
QRS COUNT: 15 BEATS
QRS DURATION: 84 MS
QT INTERVAL: 374 MS
QTC CALCULATION(BAZETT): 462 MS
QTC FREDERICIA: 431 MS
R AXIS: -39 DEGREES
T AXIS: 4 DEGREES
T OFFSET: 401 MS
VENTRICULAR RATE: 92 BPM

## 2025-06-13 ENCOUNTER — APPOINTMENT (OUTPATIENT)
Dept: PRIMARY CARE | Facility: CLINIC | Age: 85
End: 2025-06-13
Payer: COMMERCIAL

## 2025-06-13 ENCOUNTER — OFFICE VISIT (OUTPATIENT)
Dept: PRIMARY CARE | Facility: CLINIC | Age: 85
End: 2025-06-13
Payer: COMMERCIAL

## 2025-06-13 VITALS
OXYGEN SATURATION: 97 % | RESPIRATION RATE: 16 BRPM | SYSTOLIC BLOOD PRESSURE: 132 MMHG | HEIGHT: 65 IN | TEMPERATURE: 98.2 F | HEART RATE: 94 BPM | WEIGHT: 155 LBS | DIASTOLIC BLOOD PRESSURE: 62 MMHG | BODY MASS INDEX: 25.83 KG/M2

## 2025-06-13 DIAGNOSIS — F02.B3 MODERATE ALZHEIMER'S DEMENTIA WITH MOOD DISTURBANCE, UNSPECIFIED TIMING OF DEMENTIA ONSET (MULTI): ICD-10-CM

## 2025-06-13 DIAGNOSIS — M19.041 PRIMARY OSTEOARTHRITIS OF RIGHT HAND: Primary | ICD-10-CM

## 2025-06-13 DIAGNOSIS — G30.9 MODERATE ALZHEIMER'S DEMENTIA WITH MOOD DISTURBANCE, UNSPECIFIED TIMING OF DEMENTIA ONSET (MULTI): ICD-10-CM

## 2025-06-13 PROCEDURE — 1036F TOBACCO NON-USER: CPT

## 2025-06-13 PROCEDURE — G2211 COMPLEX E/M VISIT ADD ON: HCPCS

## 2025-06-13 PROCEDURE — 1159F MED LIST DOCD IN RCRD: CPT

## 2025-06-13 PROCEDURE — 3078F DIAST BP <80 MM HG: CPT

## 2025-06-13 PROCEDURE — 3075F SYST BP GE 130 - 139MM HG: CPT

## 2025-06-13 PROCEDURE — 99214 OFFICE O/P EST MOD 30 MIN: CPT

## 2025-06-13 RX ORDER — DICLOFENAC SODIUM 10 MG/G
4 GEL TOPICAL 4 TIMES DAILY
Qty: 100 G | Refills: 1 | Status: SHIPPED | OUTPATIENT
Start: 2025-06-13

## 2025-06-13 ASSESSMENT — PATIENT HEALTH QUESTIONNAIRE - PHQ9
2. FEELING DOWN, DEPRESSED OR HOPELESS: NOT AT ALL
1. LITTLE INTEREST OR PLEASURE IN DOING THINGS: NOT AT ALL
SUM OF ALL RESPONSES TO PHQ9 QUESTIONS 1 AND 2: 0

## 2025-06-13 NOTE — PROGRESS NOTES
"Subjective   Patient ID: Daron Burrell is a 85 y.o. female who presents for Hand Pain (Right hand tips are numb and red.).    Hand Pain      History of Present Illness  The patient is an 85-year-old female who presents today with a right hand complaint.    She reports experiencing pain at the tips of her fingers, which restricts her ability to form a full fist. Her caregiver noted that her hand appeared more swollen than usual yesterday. She does not experience any numbness, weakness or tingling sensations in her arm or feet.    She reports a sensation of sound in her eyes and experiences dry mouth in the mornings. She plans to consult a dentist regarding this issue. She was previously informed that her Medicaid would not cover dental visits.    She has a resting tremor however has not been diagnosed with Parkinson's disease and occasionally experiences hallucinations, such as seeing her children when they are not present. She is able to prepare her own coffee independently.    Recently had acute facial trauma 2/2 glass cabinet door striking her face, above R eye       Review of Systems    Objective   /62   Pulse 94   Temp 36.8 °C (98.2 °F)   Resp 16   Ht 1.651 m (5' 5\")   Wt 70.3 kg (155 lb)   SpO2 97%   BMI 25.79 kg/m²     Physical Exam  Physical Exam  Respiratory: Clear to auscultation, no wheezing, rales or rhonchi  Gastrointestinal: Soft, no tenderness, no distention, no masses  Extremities: No edema, no cyanosis  Musculoskeletal: Arthritis noted in the right hand with limited movement and swelling, no pain in knuckles or wrist no excessive warmth or redness around MCP/PIP, heberden nodes present       Assessment/Plan     Assessment & Plan  1. Arthritis.  - Symptoms suggest osteoarthritis, particularly affecting the DIPs of the BL hands.  - Increased pain and limited mobility.  - Discussion about the condition and the use of Tylenol and Voltaren gel.  - Voltaren gel prescribed for topical " application four times daily to alleviate pain.    2. Hematoma.  - Discoloration of the eye, which appears green, indicates a resolving hematoma.  - No pain reported in the eye. No ocular symptoms noted. No conjunctival injection  - Explanation provided about the hematoma and its healing process.  - No further treatment necessary as it is healing.    3. Resting tremor concerning for parkinson's disease.  - Tremor and other symptoms indicative of Parkinson's disease.  - No cogwheel rigidity observed; patient moves okay.  - Encouraged to stay active and continue performing daily activities such as making coffee.  - Discussion about the importance of movement to manage symptoms, given advanced age and overall non-bothersome symptoms do not feel dopamine replacement warranted at this time. Discussed treatment options with Granddaughter who is present, agree to forego treatment through shared decision making    4. Dementia with mood disturbance  -Tolerating sertraline well, no adverse effects reported  -Will continue at current dose     Return to office in 1 month for A1c recheck  Fabian Narvaez, DO       This medical note was created with the assistance of artificial intelligence (AI) for documentation purposes. The content has been reviewed and confirmed by the healthcare provider for accuracy and completeness. Patient consented to the use of audio recording and use of AI during their visit.

## 2025-07-02 DIAGNOSIS — E11.9 TYPE 2 DIABETES MELLITUS WITHOUT COMPLICATION, WITHOUT LONG-TERM CURRENT USE OF INSULIN: Chronic | ICD-10-CM

## 2025-07-03 RX ORDER — METFORMIN HYDROCHLORIDE 500 MG/1
TABLET ORAL
Qty: 270 TABLET | Refills: 1 | Status: SHIPPED | OUTPATIENT
Start: 2025-07-03

## 2025-07-15 ENCOUNTER — APPOINTMENT (OUTPATIENT)
Dept: PRIMARY CARE | Facility: CLINIC | Age: 85
End: 2025-07-15
Payer: COMMERCIAL

## 2025-07-15 VITALS
WEIGHT: 158 LBS | SYSTOLIC BLOOD PRESSURE: 122 MMHG | TEMPERATURE: 97.2 F | BODY MASS INDEX: 26.33 KG/M2 | HEIGHT: 65 IN | DIASTOLIC BLOOD PRESSURE: 70 MMHG

## 2025-07-15 DIAGNOSIS — F02.B3 MODERATE ALZHEIMER'S DEMENTIA WITH MOOD DISTURBANCE, UNSPECIFIED TIMING OF DEMENTIA ONSET (MULTI): Primary | ICD-10-CM

## 2025-07-15 DIAGNOSIS — E11.9 TYPE 2 DIABETES MELLITUS WITHOUT COMPLICATION, WITHOUT LONG-TERM CURRENT USE OF INSULIN: Chronic | ICD-10-CM

## 2025-07-15 DIAGNOSIS — G30.9 MODERATE ALZHEIMER'S DEMENTIA WITH MOOD DISTURBANCE, UNSPECIFIED TIMING OF DEMENTIA ONSET (MULTI): Primary | ICD-10-CM

## 2025-07-15 DIAGNOSIS — I10 PRIMARY HYPERTENSION: ICD-10-CM

## 2025-07-15 PROBLEM — J32.9 CHRONIC SINUSITIS: Status: RESOLVED | Noted: 2025-07-15 | Resolved: 2025-07-15

## 2025-07-15 PROCEDURE — 3074F SYST BP LT 130 MM HG: CPT

## 2025-07-15 PROCEDURE — 3078F DIAST BP <80 MM HG: CPT

## 2025-07-15 PROCEDURE — 1170F FXNL STATUS ASSESSED: CPT

## 2025-07-15 PROCEDURE — 99213 OFFICE O/P EST LOW 20 MIN: CPT

## 2025-07-15 PROCEDURE — G2211 COMPLEX E/M VISIT ADD ON: HCPCS

## 2025-07-15 ASSESSMENT — ACTIVITIES OF DAILY LIVING (ADL)
MANAGING_FINANCES: NEEDS ASSISTANCE
BATHING: INDEPENDENT
DOING_HOUSEWORK: NEEDS ASSISTANCE
DRESSING: INDEPENDENT
TAKING_MEDICATION: NEEDS ASSISTANCE
GROCERY_SHOPPING: NEEDS ASSISTANCE

## 2025-07-15 ASSESSMENT — PATIENT HEALTH QUESTIONNAIRE - PHQ9
2. FEELING DOWN, DEPRESSED OR HOPELESS: SEVERAL DAYS
SUM OF ALL RESPONSES TO PHQ9 QUESTIONS 1 AND 2: 2
1. LITTLE INTEREST OR PLEASURE IN DOING THINGS: SEVERAL DAYS

## 2025-07-17 NOTE — PROGRESS NOTES
"Subjective   Patient ID: Daron Burrell is a 85 y.o. female who presents for Follow-up.    HPI   Patient here today for follow-up appointment.  Is compliant with all home medications, here today with adult son who states that patient's mood is still concerning.  She has been having some hallucinations along with irritability and will at times argue with family members.  Has seen some improvement since starting sertraline.  Review of Systems    Objective   /70 (BP Location: Left arm, Patient Position: Sitting)   Temp 36.2 °C (97.2 °F)   Ht 1.651 m (5' 5\")   Wt 71.7 kg (158 lb)   BMI 26.29 kg/m²     Physical Exam  Constitutional:       General: She is not in acute distress.     Appearance: Normal appearance. She is not ill-appearing.     Eyes:      General: No scleral icterus.      Cardiovascular:      Rate and Rhythm: Normal rate and regular rhythm.      Heart sounds: No murmur heard.     No friction rub. No gallop.   Pulmonary:      Effort: Pulmonary effort is normal. No respiratory distress.      Breath sounds: Normal breath sounds. No wheezing, rhonchi or rales.     Neurological:      Mental Status: She is alert. Mental status is at baseline.     Psychiatric:         Mood and Affect: Mood normal.         Behavior: Behavior normal.         Assessment/Plan   Problem List Items Addressed This Visit           ICD-10-CM    Type 2 diabetes mellitus without complication, without long-term current use of insulin (Chronic) E11.9    Patient due for repeat A1c, will obtain at next visit.  Continue with Farxiga/metformin.         Moderate Alzheimer's dementia with mood disturbance (Multi) - Primary G30.9, F02.B3    Patient to continue with Zoloft for the time being.  Previously broached initiation of acetylcholinesterase inhibitors rather memory related medications.  Family decision at this time to manage conservatively.  She does show some features of parkinsonian dementia including hallucinations along with " tremor, will continue to monitor         Primary hypertension I10    Blood pressure well-controlled, he is not currently on any antihypertensive medication          Patient to follow-up for Medicare annual wellness visit

## 2025-07-28 NOTE — ASSESSMENT & PLAN NOTE
Patient to continue with Zoloft for the time being.  Previously broached initiation of acetylcholinesterase inhibitors rather memory related medications.  Family decision at this time to manage conservatively.  She does show some features of parkinsonian dementia including hallucinations along with tremor, will continue to monitor

## 2025-08-04 DIAGNOSIS — E11.9 TYPE 2 DIABETES MELLITUS WITHOUT COMPLICATION, WITHOUT LONG-TERM CURRENT USE OF INSULIN: Chronic | ICD-10-CM

## 2025-08-04 RX ORDER — ATORVASTATIN CALCIUM 10 MG/1
10 TABLET, FILM COATED ORAL DAILY
Qty: 90 TABLET | Refills: 1 | Status: SHIPPED | OUTPATIENT
Start: 2025-08-04

## 2025-10-15 ENCOUNTER — APPOINTMENT (OUTPATIENT)
Dept: PRIMARY CARE | Facility: CLINIC | Age: 85
End: 2025-10-15
Payer: COMMERCIAL